# Patient Record
Sex: MALE | Race: WHITE | NOT HISPANIC OR LATINO
[De-identification: names, ages, dates, MRNs, and addresses within clinical notes are randomized per-mention and may not be internally consistent; named-entity substitution may affect disease eponyms.]

---

## 2019-11-04 ENCOUNTER — APPOINTMENT (OUTPATIENT)
Dept: RHEUMATOLOGY | Facility: CLINIC | Age: 74
End: 2019-11-04
Payer: MEDICARE

## 2019-11-04 VITALS
SYSTOLIC BLOOD PRESSURE: 163 MMHG | OXYGEN SATURATION: 100 % | TEMPERATURE: 98.4 F | WEIGHT: 190 LBS | BODY MASS INDEX: 28.14 KG/M2 | HEIGHT: 69 IN | HEART RATE: 63 BPM | DIASTOLIC BLOOD PRESSURE: 96 MMHG

## 2019-11-04 DIAGNOSIS — Z83.3 FAMILY HISTORY OF DIABETES MELLITUS: ICD-10-CM

## 2019-11-04 DIAGNOSIS — Z82.3 FAMILY HISTORY OF STROKE: ICD-10-CM

## 2019-11-04 DIAGNOSIS — Z80.6 FAMILY HISTORY OF LEUKEMIA: ICD-10-CM

## 2019-11-04 DIAGNOSIS — Z80.9 FAMILY HISTORY OF MALIGNANT NEOPLASM, UNSPECIFIED: ICD-10-CM

## 2019-11-04 DIAGNOSIS — Z78.9 OTHER SPECIFIED HEALTH STATUS: ICD-10-CM

## 2019-11-04 DIAGNOSIS — Z82.49 FAMILY HISTORY OF ISCHEMIC HEART DISEASE AND OTHER DISEASES OF THE CIRCULATORY SYSTEM: ICD-10-CM

## 2019-11-04 DIAGNOSIS — F19.90 OTHER PSYCHOACTIVE SUBSTANCE USE, UNSPECIFIED, UNCOMPLICATED: ICD-10-CM

## 2019-11-04 PROBLEM — Z00.00 ENCOUNTER FOR PREVENTIVE HEALTH EXAMINATION: Status: ACTIVE | Noted: 2019-11-04

## 2019-11-04 PROCEDURE — 99214 OFFICE O/P EST MOD 30 MIN: CPT | Mod: 25

## 2019-11-04 PROCEDURE — 36415 COLL VENOUS BLD VENIPUNCTURE: CPT

## 2019-11-04 RX ORDER — CHLORTHALIDONE 25 MG/1
25 TABLET ORAL
Qty: 90 | Refills: 0 | Status: ACTIVE | COMMUNITY
Start: 2019-08-01

## 2019-11-04 RX ORDER — APIXABAN 5 MG/1
5 TABLET, FILM COATED ORAL
Qty: 60 | Refills: 0 | Status: ACTIVE | COMMUNITY
Start: 2019-05-10

## 2019-11-04 RX ORDER — NEBIVOLOL HYDROCHLORIDE 20 MG/1
20 TABLET ORAL
Qty: 45 | Refills: 0 | Status: ACTIVE | COMMUNITY
Start: 2019-08-12

## 2019-11-04 RX ORDER — EVOLOCUMAB 140 MG/ML
140 INJECTION, SOLUTION SUBCUTANEOUS
Qty: 2 | Refills: 0 | Status: ACTIVE | COMMUNITY
Start: 2019-05-31

## 2019-11-04 RX ORDER — COLCHICINE 0.6 MG/1
0.6 TABLET ORAL
Qty: 60 | Refills: 0 | Status: ACTIVE | COMMUNITY
Start: 2019-11-04

## 2019-11-04 RX ORDER — AZILSARTAN KAMEDOXOMIL 80 MG/1
80 TABLET ORAL
Qty: 90 | Refills: 0 | Status: ACTIVE | COMMUNITY
Start: 2019-07-19

## 2019-11-05 ENCOUNTER — APPOINTMENT (OUTPATIENT)
Dept: RHEUMATOLOGY | Facility: CLINIC | Age: 74
End: 2019-11-05

## 2019-11-05 LAB
ALBUMIN SERPL ELPH-MCNC: 4.9 G/DL
ALP BLD-CCNC: 59 U/L
ALT SERPL-CCNC: 21 U/L
ANION GAP SERPL CALC-SCNC: 15 MMOL/L
AST SERPL-CCNC: 17 U/L
BASOPHILS # BLD AUTO: 0.02 K/UL
BASOPHILS NFR BLD AUTO: 0.2 %
BILIRUB SERPL-MCNC: 0.9 MG/DL
BUN SERPL-MCNC: 17 MG/DL
CALCIUM SERPL-MCNC: 10.7 MG/DL
CHLORIDE SERPL-SCNC: 102 MMOL/L
CO2 SERPL-SCNC: 25 MMOL/L
CREAT SERPL-MCNC: 1.21 MG/DL
EOSINOPHIL # BLD AUTO: 0 K/UL
EOSINOPHIL NFR BLD AUTO: 0 %
GLUCOSE SERPL-MCNC: 118 MG/DL
HCT VFR BLD CALC: 47.4 %
HGB BLD-MCNC: 15.4 G/DL
IMM GRANULOCYTES NFR BLD AUTO: 0.5 %
LYMPHOCYTES # BLD AUTO: 0.65 K/UL
LYMPHOCYTES NFR BLD AUTO: 6.9 %
MAN DIFF?: NORMAL
MCHC RBC-ENTMCNC: 27.4 PG
MCHC RBC-ENTMCNC: 32.5 GM/DL
MCV RBC AUTO: 84.2 FL
MONOCYTES # BLD AUTO: 0.41 K/UL
MONOCYTES NFR BLD AUTO: 4.3 %
NEUTROPHILS # BLD AUTO: 8.3 K/UL
NEUTROPHILS NFR BLD AUTO: 88.1 %
PLATELET # BLD AUTO: 324 K/UL
POTASSIUM SERPL-SCNC: 4.7 MMOL/L
PROT SERPL-MCNC: 7.4 G/DL
RBC # BLD: 5.63 M/UL
RBC # FLD: 14.2 %
SODIUM SERPL-SCNC: 142 MMOL/L
URATE SERPL-MCNC: 6.6 MG/DL
WBC # FLD AUTO: 9.43 K/UL

## 2019-11-05 NOTE — HISTORY OF PRESENT ILLNESS
[FreeTextEntry1] : This is a 74-year-old man he presents for an initial evaluation is been referred by his primary care physician Dr. Misbah Hathaway is a question of him having either gout or possibly pseudogout. He has been seen by me in the past for similar issues but not in quite some time. He has had long-standing difficult to manage hypertension he's apparently been on a variety of diuretics most recently hydrochlorothiazide 12-1/2 mg. He's had episodes of what sound like typical gout he reports that he's had approximately 4 episodes over the past 5 years however about a week or so ago he developed acute pain and swelling in his left big toe he was given a short course of prednisone starting at 40 mg a day and symptoms resolved however quickly returned he now comes in today with again recurrent pain and swelling in his right big toe he's been restarted on prednisone he's also been given a prescription for colchicine his last attack was probably 2 years ago in 2017. He does seem to avoid foods known associated with gout he does not drink beer he does not drink or do needs. He has had chronic renal insufficiency he has also had use of diuretics. He does have a history of hyperparathyroidism. He has had elevated calcium levels. There apparently has been discussion of a parathyroidectomy. However a parathyroid adenoma has never been identified and these never actually had surgery he also reports that he's been having some pain in his left hip this dates back about 6-1/2 weeks. His difficulty with walking but apparently does maintain motion there he's had an MRI of his hip and is scheduled to see a hip specialist he is wondering if gout could not also affect the hip. He again has had long-standing hypertension difficulty with management of his blood pressure.

## 2019-11-05 NOTE — DATA REVIEWED
[FreeTextEntry1] : Data from Dr. Crawley spine. Reviewed. Most recent labs were from approximately 2 months ago in September of 2019 creatinine was 1.56. CBC was normal. Urinalysis showed protein.

## 2019-11-05 NOTE — REVIEW OF SYSTEMS
[Feeling Poorly] : feeling poorly [Feeling Tired] : feeling tired [Arthralgias] : arthralgias [Joint Pain] : joint pain [Joint Swelling] : joint swelling [Joint Stiffness] : joint stiffness [Fever] : no fever [Chills] : no chills [Eye Pain] : no eye pain [Red Eyes] : eyes not red [Dry Eyes] : no dryness of the eyes [Chest Pain] : no chest pain [Palpitations] : no palpitations [Shortness Of Breath] : no shortness of breath [Wheezing] : no wheezing [Cough] : no cough [SOB on Exertion] : no shortness of breath during exertion [Abdominal Pain] : no abdominal pain [Vomiting] : no vomiting [Constipation] : no constipation [Diarrhea] : no diarrhea [Skin Lesions] : no skin lesions [Itching] : no itching [Muscle Weakness] : no muscle weakness [FreeTextEntry7] : Tolerance pertinent [FreeTextEntry9] : Pain in left big toe pain and left hip

## 2019-11-05 NOTE — PHYSICAL EXAM
[General Appearance - Alert] : alert [General Appearance - Well Nourished] : well nourished [General Appearance - Well Developed] : well developed [Examination Of The Oral Cavity] : the lips and gums were normal [Oropharynx] : the oropharynx was normal [] : no respiratory distress [Exaggerated Use Of Accessory Muscles For Inspiration] : no accessory muscle use [Auscultation Breath Sounds / Voice Sounds] : lungs were clear to auscultation bilaterally [Heart Rate And Rhythm] : heart rate was normal and rhythm regular [Heart Sounds] : normal S1 and S2 [No Spinal Tenderness] : no spinal tenderness [Cranial Nerves] : cranial nerves 2-12 were intact [Motor Exam] : the motor exam was normal [FreeTextEntry1] : There is acute podagra with redness and swelling of the first MTP joint on the left foot. There are no tophi. There is good range of motion of the left hip without limitations.

## 2019-11-05 NOTE — ASSESSMENT
[FreeTextEntry1] : This is a 74-year-old man. Presents for followup evaluation. He has a long-standing history do back about 5 years of recurrent episodes consistent with gout. He's had approximately 4 prior episodes comes in now with acute podagra of his left big toe. Started over a week ago initially responded to prednisone but with tapering off prednisone had rebound gout. Has opted to restart prednisone at 40 mg every day with a program for tapering. I am updating his bloods and uric acid today. He is on a diuretic which perhaps is contributing this has been discussed with both him and his primary care physician and may need to be reassessed as contributing to this current episode started him on colchicine at 0.6 once a day although may need to reduce his based on renal function. Again reviewed with him dietary considerations with gout. I plan to see him back in a week to discuss additional management. I am not sure what the frequency of these attacks that he requires long-term serum uric acid lowering. He does have a history of hyperparathyroidism. I don't think this represents pseudogout.

## 2019-11-14 ENCOUNTER — APPOINTMENT (OUTPATIENT)
Dept: RHEUMATOLOGY | Facility: CLINIC | Age: 74
End: 2019-11-14
Payer: MEDICARE

## 2019-11-14 PROCEDURE — 99213 OFFICE O/P EST LOW 20 MIN: CPT

## 2019-11-14 NOTE — REVIEW OF SYSTEMS
[Joint Pain] : joint pain [Joint Stiffness] : joint stiffness [Fever] : no fever [Feeling Poorly] : not feeling poorly [Chills] : no chills [Feeling Tired] : not feeling tired [Chest Pain] : no chest pain [Palpitations] : no palpitations [Abdominal Pain] : no abdominal pain [Diarrhea] : no diarrhea [Heartburn] : no heartburn [Joint Swelling] : no joint swelling [Arthralgias] : no arthralgias [Skin Lesions] : no skin lesions [Itching] : no itching [FreeTextEntry9] : Isolated to hip current [de-identified] : No tophi

## 2019-11-14 NOTE — HISTORY OF PRESENT ILLNESS
[FreeTextEntry1] : This is a 74-year-old man who presents for followup evaluation. 4 gout in his left big toe. He is completing a course of prednisone. He remains on colchicine once a day she is tolerating this well he comes for review of labs . He does have a history of hypertension which has been somewhat difficult to control. He remains on a diuretic which may be contributing to his predisposition to gout - He complains only of left hip pain is apparently seen an orthopedic surgeon regarding consideration of possible hip replacement is scheduled to see another orthopedist for consideration. He had no tophi. He has had elevation of calcium. He seen an endocrinologist he is undergoing a workup with elevated PTH levels.

## 2019-11-14 NOTE — DATA REVIEWED
[FreeTextEntry1] : I reviewed his labs creatinine appears within the normal range. Uric acid is 6.6. Calcium is 10.5. On a diuretic which may be contributing to this as well as gout

## 2019-11-14 NOTE — PHYSICAL EXAM
[General Appearance - Alert] : alert [General Appearance - Well Nourished] : well nourished [General Appearance - Well Developed] : well developed [No CVA Tenderness] : no ~M costovertebral angle tenderness [No Spinal Tenderness] : no spinal tenderness [Musculoskeletal - Swelling] : no joint swelling seen [Skin Color & Pigmentation] : normal skin color and pigmentation [] : no rash [FreeTextEntry1] : No tophi

## 2019-11-14 NOTE — ASSESSMENT
[FreeTextEntry1] : This is a 74-year-old man presents for followup evaluation he has a history of gout he's had a recent prolonged episode with rebound following stopping of steroids she is now only on daily colchicine. Finishing second course of prednisone. I recommended remaining off prednisone for now continuing colchicine we did discuss the possibility of allopurinol in the future but I am deferring this at this time we also went to dietary issues have also discussed the role of his diuretic in possibly contributing to his gout possibly contributing to his hypercalcemia. He will be following up with another orthopedic surgeon apparently has been advised that he have a left total hip replacement at some point.

## 2019-11-19 ENCOUNTER — FORM ENCOUNTER (OUTPATIENT)
Age: 74
End: 2019-11-19

## 2019-11-20 ENCOUNTER — OUTPATIENT (OUTPATIENT)
Dept: OUTPATIENT SERVICES | Facility: HOSPITAL | Age: 74
LOS: 1 days | End: 2019-11-20
Payer: MEDICARE

## 2019-11-20 ENCOUNTER — APPOINTMENT (OUTPATIENT)
Dept: ORTHOPEDIC SURGERY | Facility: CLINIC | Age: 74
End: 2019-11-20
Payer: MEDICARE

## 2019-11-20 VITALS — BODY MASS INDEX: 28.14 KG/M2 | HEIGHT: 69 IN | WEIGHT: 190 LBS

## 2019-11-20 PROCEDURE — 99203 OFFICE O/P NEW LOW 30 MIN: CPT

## 2019-11-20 PROCEDURE — 73521 X-RAY EXAM HIPS BI 2 VIEWS: CPT | Mod: 26

## 2019-11-20 PROCEDURE — 73521 X-RAY EXAM HIPS BI 2 VIEWS: CPT

## 2019-11-21 NOTE — ASSESSMENT
[FreeTextEntry1] : Assessment/Plan\par \par OA of left hip\par \par Patient would benefit from left DELMER\par \par Patient will benefit from the proposed procedure, he has failed a conservative treatment plan of supervised physical therapy, anti-inflammatory medications, and activity modification. He continues to have pain impacting his ability to perform ADL's and has a decreasing QoL. We will schedule this for the earliest mutually convenient time after the appropriate pre-op laboratory tests and clearances are obtained.  All questions were answered and a thorough explanation of RBA were given.\par \par All medical record entries made by the PA/Blazeibcornelio/Fellow are at my, Dr. Ruiz Castaneda's direction and personally dictated by me on 11/20/2019]. I have reviewed the chart and agree that the record accurately reflects my personal performance of the history, physical exam, assessment, and plan. I have also personally directed reviewed, and agreed with the chart.\par

## 2019-11-21 NOTE — PHYSICAL EXAM
[de-identified] : General: Not in acute distress, dressed appropriately, sitting on examination table\par Skin: Warm and dry, normal turgor, no rashes\par Neurological: AOx3, Cranial nerves grossly in tact\par Psych: Mood and affect appropriate\par \par Left Hip: No swelling edema erythema redness or drainage. Tender anteriorly. ROM: Hip flexion 120, abduction 30, int/ext rotation 45. Painful range of motion. 5/5 strength. Ambulates with cane. \par \par \par  [de-identified] : X-ray of left hip shows left hip OA

## 2019-11-21 NOTE — HISTORY OF PRESENT ILLNESS
[de-identified] : Kem is a 74 year old male who is here for left hip pain. Pain has progressively worsened over the past month. He denies any injury or trauma. Pain is dull and achy. Pain is worse with weightbearing and deep flexion. Patient was seen by pain management where he had a hip injection which was only effective for 1-2 days.

## 2019-11-25 ENCOUNTER — TRANSCRIPTION ENCOUNTER (OUTPATIENT)
Age: 74
End: 2019-11-25

## 2019-12-09 ENCOUNTER — TRANSCRIPTION ENCOUNTER (OUTPATIENT)
Age: 74
End: 2019-12-09

## 2020-01-13 ENCOUNTER — APPOINTMENT (OUTPATIENT)
Dept: RHEUMATOLOGY | Facility: CLINIC | Age: 75
End: 2020-01-13

## 2020-01-22 ENCOUNTER — APPOINTMENT (OUTPATIENT)
Dept: RHEUMATOLOGY | Facility: CLINIC | Age: 75
End: 2020-01-22
Payer: MEDICARE

## 2020-01-22 VITALS
HEIGHT: 69 IN | HEART RATE: 65 BPM | SYSTOLIC BLOOD PRESSURE: 160 MMHG | OXYGEN SATURATION: 97 % | WEIGHT: 194 LBS | DIASTOLIC BLOOD PRESSURE: 76 MMHG | BODY MASS INDEX: 28.73 KG/M2 | TEMPERATURE: 98 F

## 2020-01-22 DIAGNOSIS — M10.9 GOUT, UNSPECIFIED: ICD-10-CM

## 2020-01-22 DIAGNOSIS — M16.0 BILATERAL PRIMARY OSTEOARTHRITIS OF HIP: ICD-10-CM

## 2020-01-22 PROCEDURE — 99213 OFFICE O/P EST LOW 20 MIN: CPT

## 2020-01-22 NOTE — ASSESSMENT
[FreeTextEntry1] : This is a 74-year-old man difficult to manage doubt now currently asymptomatic with this colchicine every day possibly a bit higher based on creatinine of 1.2 however concerned about reduction in dose of colchicine to every other day with possible upcoming hip or possibly upcoming pancreas surgery have recommended continuing it I have reviewed with him his x-rays he does have osteoarthritis of both hips somewhat worse on the left than the right. He is deferring surgery on that hip until followup evaluation of pancreatic lesion.

## 2020-01-22 NOTE — DATA REVIEWED
[FreeTextEntry1] : Hip x-rays reviewed with the patient. Osteoarthritis of both hips worse on left than right

## 2020-01-22 NOTE — PHYSICAL EXAM
[General Appearance - Alert] : alert [General Appearance - Well Nourished] : well nourished [General Appearance - Well Developed] : well developed [FreeTextEntry1] : walks with a cane - no podagra - Decreased range of motion in the left hip.

## 2020-01-22 NOTE — REVIEW OF SYSTEMS
[Joint Pain] : joint pain [Feeling Poorly] : not feeling poorly [Abdominal Pain] : no abdominal pain [Vomiting] : no vomiting [Constipation] : no constipation [Joint Swelling] : no joint swelling [Joint Stiffness] : no joint stiffness [Diarrhea] : no diarrhea [FreeTextEntry7] : tolerates colchicine [FreeTextEntry9] : isolated to left hip pain

## 2020-01-22 NOTE — HISTORY OF PRESENT ILLNESS
[FreeTextEntry1] : on daily colchicine One pill daily tolerating it well no GI issues has had no gout attacks since starting this is now having hypertension management but back on diuretics without exacerbation of gout. Other issues include hip arthritis has been seen by orthopedic surgery have reviewed notes by Dr. Castaneda as well as x-rays. Does report interim history has had a incidental finding of a pancreatic mass as part of workup for hypertension that was been difficult to control for many years. Her biopsy awaiting results. Again no episodes of joint pain.

## 2020-01-24 ENCOUNTER — EMERGENCY (EMERGENCY)
Facility: HOSPITAL | Age: 75
LOS: 1 days | Discharge: HOME | End: 2020-01-24
Attending: EMERGENCY MEDICINE | Admitting: EMERGENCY MEDICINE
Payer: MEDICARE

## 2020-01-24 VITALS
SYSTOLIC BLOOD PRESSURE: 109 MMHG | OXYGEN SATURATION: 97 % | TEMPERATURE: 98 F | HEART RATE: 82 BPM | HEIGHT: 69 IN | RESPIRATION RATE: 18 BRPM | DIASTOLIC BLOOD PRESSURE: 57 MMHG

## 2020-01-24 VITALS
SYSTOLIC BLOOD PRESSURE: 118 MMHG | RESPIRATION RATE: 16 BRPM | DIASTOLIC BLOOD PRESSURE: 73 MMHG | TEMPERATURE: 99 F | HEART RATE: 76 BPM | OXYGEN SATURATION: 97 %

## 2020-01-24 LAB
ALBUMIN SERPL ELPH-MCNC: 3.3 G/DL — LOW (ref 3.4–5)
ALBUMIN SERPL ELPH-MCNC: 3.7 G/DL — SIGNIFICANT CHANGE UP (ref 3.4–5)
ALP SERPL-CCNC: 61 U/L — SIGNIFICANT CHANGE UP (ref 40–120)
ALP SERPL-CCNC: 67 U/L — SIGNIFICANT CHANGE UP (ref 40–120)
ALT FLD-CCNC: 31 U/L — SIGNIFICANT CHANGE UP (ref 12–42)
ALT FLD-CCNC: 38 U/L — SIGNIFICANT CHANGE UP (ref 12–42)
ANION GAP SERPL CALC-SCNC: 11 MMOL/L — SIGNIFICANT CHANGE UP (ref 9–16)
ANION GAP SERPL CALC-SCNC: 6 MMOL/L — LOW (ref 9–16)
APTT BLD: 39.5 SEC — HIGH (ref 27.5–36.3)
AST SERPL-CCNC: 21 U/L — SIGNIFICANT CHANGE UP (ref 15–37)
AST SERPL-CCNC: 25 U/L — SIGNIFICANT CHANGE UP (ref 15–37)
BASOPHILS # BLD AUTO: 0.08 K/UL — SIGNIFICANT CHANGE UP (ref 0–0.2)
BASOPHILS NFR BLD AUTO: 0.7 % — SIGNIFICANT CHANGE UP (ref 0–2)
BILIRUB SERPL-MCNC: 1 MG/DL — SIGNIFICANT CHANGE UP (ref 0.2–1.2)
BILIRUB SERPL-MCNC: 1.1 MG/DL — SIGNIFICANT CHANGE UP (ref 0.2–1.2)
BUN SERPL-MCNC: 25 MG/DL — HIGH (ref 7–23)
BUN SERPL-MCNC: 28 MG/DL — HIGH (ref 7–23)
CALCIUM SERPL-MCNC: 8.4 MG/DL — LOW (ref 8.5–10.5)
CALCIUM SERPL-MCNC: 9.2 MG/DL — SIGNIFICANT CHANGE UP (ref 8.5–10.5)
CHLORIDE SERPL-SCNC: 105 MMOL/L — SIGNIFICANT CHANGE UP (ref 96–108)
CHLORIDE SERPL-SCNC: 105 MMOL/L — SIGNIFICANT CHANGE UP (ref 96–108)
CO2 SERPL-SCNC: 26 MMOL/L — SIGNIFICANT CHANGE UP (ref 22–31)
CO2 SERPL-SCNC: 28 MMOL/L — SIGNIFICANT CHANGE UP (ref 22–31)
CREAT SERPL-MCNC: 1.8 MG/DL — HIGH (ref 0.5–1.3)
CREAT SERPL-MCNC: 1.92 MG/DL — HIGH (ref 0.5–1.3)
EOSINOPHIL # BLD AUTO: 0.03 K/UL — SIGNIFICANT CHANGE UP (ref 0–0.5)
EOSINOPHIL NFR BLD AUTO: 0.2 % — SIGNIFICANT CHANGE UP (ref 0–6)
GLUCOSE SERPL-MCNC: 164 MG/DL — HIGH (ref 70–99)
GLUCOSE SERPL-MCNC: 211 MG/DL — HIGH (ref 70–99)
HCT VFR BLD CALC: 48.8 % — SIGNIFICANT CHANGE UP (ref 39–50)
HGB BLD-MCNC: 16.8 G/DL — SIGNIFICANT CHANGE UP (ref 13–17)
IMM GRANULOCYTES NFR BLD AUTO: 0.5 % — SIGNIFICANT CHANGE UP (ref 0–1.5)
INR BLD: 1.19 — HIGH (ref 0.88–1.16)
LIDOCAIN IGE QN: 133 U/L — SIGNIFICANT CHANGE UP (ref 73–393)
LYMPHOCYTES # BLD AUTO: 0.57 K/UL — LOW (ref 1–3.3)
LYMPHOCYTES # BLD AUTO: 4.6 % — LOW (ref 13–44)
MAGNESIUM SERPL-MCNC: 1.6 MG/DL — SIGNIFICANT CHANGE UP (ref 1.6–2.6)
MAGNESIUM SERPL-MCNC: 5 MG/DL — HIGH (ref 1.6–2.6)
MCHC RBC-ENTMCNC: 28 PG — SIGNIFICANT CHANGE UP (ref 27–34)
MCHC RBC-ENTMCNC: 34.4 GM/DL — SIGNIFICANT CHANGE UP (ref 32–36)
MCV RBC AUTO: 81.3 FL — SIGNIFICANT CHANGE UP (ref 80–100)
MONOCYTES # BLD AUTO: 0.44 K/UL — SIGNIFICANT CHANGE UP (ref 0–0.9)
MONOCYTES NFR BLD AUTO: 3.6 % — SIGNIFICANT CHANGE UP (ref 2–14)
NEUTROPHILS # BLD AUTO: 11.08 K/UL — HIGH (ref 1.8–7.4)
NEUTROPHILS NFR BLD AUTO: 90.4 % — HIGH (ref 43–77)
NRBC # BLD: 0 /100 WBCS — SIGNIFICANT CHANGE UP (ref 0–0)
NT-PROBNP SERPL-SCNC: 1455 PG/ML — HIGH
PLATELET # BLD AUTO: 388 K/UL — SIGNIFICANT CHANGE UP (ref 150–400)
POTASSIUM SERPL-MCNC: 3 MMOL/L — LOW (ref 3.5–5.3)
POTASSIUM SERPL-MCNC: 3.6 MMOL/L — SIGNIFICANT CHANGE UP (ref 3.5–5.3)
POTASSIUM SERPL-SCNC: 3 MMOL/L — LOW (ref 3.5–5.3)
POTASSIUM SERPL-SCNC: 3.6 MMOL/L — SIGNIFICANT CHANGE UP (ref 3.5–5.3)
PROT SERPL-MCNC: 6.5 G/DL — SIGNIFICANT CHANGE UP (ref 6.4–8.2)
PROT SERPL-MCNC: 7.4 G/DL — SIGNIFICANT CHANGE UP (ref 6.4–8.2)
PROTHROM AB SERPL-ACNC: 13.3 SEC — HIGH (ref 10–12.9)
RBC # BLD: 6 M/UL — HIGH (ref 4.2–5.8)
RBC # FLD: 14.2 % — SIGNIFICANT CHANGE UP (ref 10.3–14.5)
SODIUM SERPL-SCNC: 139 MMOL/L — SIGNIFICANT CHANGE UP (ref 132–145)
SODIUM SERPL-SCNC: 142 MMOL/L — SIGNIFICANT CHANGE UP (ref 132–145)
TROPONIN I SERPL-MCNC: <0.017 NG/ML — LOW (ref 0.02–0.06)
TROPONIN I SERPL-MCNC: <0.017 NG/ML — LOW (ref 0.02–0.06)
WBC # BLD: 12.26 K/UL — HIGH (ref 3.8–10.5)
WBC # FLD AUTO: 12.26 K/UL — HIGH (ref 3.8–10.5)

## 2020-01-24 PROCEDURE — 72125 CT NECK SPINE W/O DYE: CPT | Mod: 26

## 2020-01-24 PROCEDURE — 70450 CT HEAD/BRAIN W/O DYE: CPT | Mod: 26

## 2020-01-24 PROCEDURE — 99291 CRITICAL CARE FIRST HOUR: CPT

## 2020-01-24 PROCEDURE — 93010 ELECTROCARDIOGRAM REPORT: CPT

## 2020-01-24 PROCEDURE — 99284 EMERGENCY DEPT VISIT MOD MDM: CPT

## 2020-01-24 PROCEDURE — 74176 CT ABD & PELVIS W/O CONTRAST: CPT | Mod: 26

## 2020-01-24 RX ORDER — IOHEXOL 300 MG/ML
30 INJECTION, SOLUTION INTRAVENOUS ONCE
Refills: 0 | Status: COMPLETED | OUTPATIENT
Start: 2020-01-24 | End: 2020-01-24

## 2020-01-24 RX ORDER — APIXABAN 2.5 MG/1
5 TABLET, FILM COATED ORAL ONCE
Refills: 0 | Status: COMPLETED | OUTPATIENT
Start: 2020-01-24 | End: 2020-01-24

## 2020-01-24 RX ORDER — POTASSIUM CHLORIDE 20 MEQ
40 PACKET (EA) ORAL ONCE
Refills: 0 | Status: COMPLETED | OUTPATIENT
Start: 2020-01-24 | End: 2020-01-24

## 2020-01-24 RX ORDER — POTASSIUM CHLORIDE 20 MEQ
10 PACKET (EA) ORAL ONCE
Refills: 0 | Status: COMPLETED | OUTPATIENT
Start: 2020-01-24 | End: 2020-01-24

## 2020-01-24 RX ORDER — SODIUM CHLORIDE 9 MG/ML
1000 INJECTION INTRAMUSCULAR; INTRAVENOUS; SUBCUTANEOUS ONCE
Refills: 0 | Status: COMPLETED | OUTPATIENT
Start: 2020-01-24 | End: 2020-01-24

## 2020-01-24 RX ORDER — MAGNESIUM SULFATE 500 MG/ML
1 VIAL (ML) INJECTION ONCE
Refills: 0 | Status: COMPLETED | OUTPATIENT
Start: 2020-01-24 | End: 2020-01-24

## 2020-01-24 RX ORDER — METOPROLOL TARTRATE 50 MG
25 TABLET ORAL ONCE
Refills: 0 | Status: COMPLETED | OUTPATIENT
Start: 2020-01-24 | End: 2020-01-24

## 2020-01-24 RX ORDER — METOPROLOL TARTRATE 50 MG
5 TABLET ORAL ONCE
Refills: 0 | Status: COMPLETED | OUTPATIENT
Start: 2020-01-24 | End: 2020-01-24

## 2020-01-24 RX ADMIN — Medication 5 MILLIGRAM(S): at 09:27

## 2020-01-24 RX ADMIN — SODIUM CHLORIDE 1000 MILLILITER(S): 9 INJECTION INTRAMUSCULAR; INTRAVENOUS; SUBCUTANEOUS at 12:56

## 2020-01-24 RX ADMIN — SODIUM CHLORIDE 1000 MILLILITER(S): 9 INJECTION INTRAMUSCULAR; INTRAVENOUS; SUBCUTANEOUS at 10:19

## 2020-01-24 RX ADMIN — IOHEXOL 30 MILLILITER(S): 300 INJECTION, SOLUTION INTRAVENOUS at 09:13

## 2020-01-24 RX ADMIN — Medication 110 MILLIGRAM(S): at 09:12

## 2020-01-24 RX ADMIN — Medication 25 MILLIGRAM(S): at 09:12

## 2020-01-24 RX ADMIN — APIXABAN 5 MILLIGRAM(S): 2.5 TABLET, FILM COATED ORAL at 09:27

## 2020-01-24 RX ADMIN — SODIUM CHLORIDE 1000 MILLILITER(S): 9 INJECTION INTRAMUSCULAR; INTRAVENOUS; SUBCUTANEOUS at 09:14

## 2020-01-24 RX ADMIN — Medication 1 GRAM(S): at 10:19

## 2020-01-24 RX ADMIN — Medication 100 GRAM(S): at 09:20

## 2020-01-24 RX ADMIN — Medication 40 MILLIEQUIVALENT(S): at 09:20

## 2020-01-24 RX ADMIN — Medication 40 MILLIEQUIVALENT(S): at 13:42

## 2020-01-24 NOTE — ED PROVIDER NOTE - PATIENT PORTAL LINK FT
You can access the FollowMyHealth Patient Portal offered by [facility] by registering at the following website: [email or web address]. By joining [employer]’s FollowMyHealth portal, you will also be able to view your health information using other applications (apps) compatible with our system.

## 2020-01-24 NOTE — ED PROVIDER NOTE - CLINICAL SUMMARY MEDICAL DECISION MAKING FREE TEXT BOX
Pt presenting with Afib in the setting of N/V/D. GI sx resemble AGE though it is unclear if his tumor is contributing. Abd tenderness is also notable. Will hydrate, replete electrolytes, give PO and IV beta-blockers and check CT AP with PO contrast. Seen by Dr. Sutton of cardiology.

## 2020-01-24 NOTE — ED PROVIDER NOTE - NSFOLLOWUPINSTRUCTIONS_ED_ALL_ED_FT
Please eat high potassium foods for the next few days like Avocado, potato and banana as well as take you potassium supplement.     Please follow up with Dr. Hathaway this week.    Have a low threshold to return to the ER for any worsening symptoms.    Nausea / Vomiting    Nausea is the feeling that you have to vomit. As nausea gets worse, it can lead to vomiting. Vomiting puts you at an increased risk for dehydration. Older adults and people with other diseases or a weak immune system are at higher risk for dehydration. Drink clear fluids in small but frequent amounts as tolerated. Eat bland, easy-to-digest foods in small amounts as tolerated.    SEEK IMMEDIATE MEDICAL CARE IF YOU HAVE ANY OF THE FOLLOWING SYMPTOMS: fever, inability to keep sufficient fluids down, black or bloody vomitus, black or bloody stools, lightheadedness/dizziness, chest pain, severe headache, rash, shortness of breath, cold or clammy skin, confusion, pain with urination, or any signs of dehydration.     Syncope    Syncope is when you temporarily lose consciousness, also called fainting or passing out. It is caused by a sudden decrease in blood flow to the brain. Even though most causes of syncope are not dangerous, syncope can possibly be a sign of a serious medical problem. Signs that you may be about to faint include feeling dizzy, lightheaded, nausea, visual changes, or cold/clammy skin. Do not drive, operate heavy machinery, or play sports until your health care provider says it is okay.    SEEK IMMEDIATE MEDICAL CARE IF YOU HAVE ANY OF THE FOLLOWING SYMPTOMS: severe headache, pain in your chest/abdomen/back, bleeding from your mouth or rectum, palpitations, shortness of breath, pain with breathing, seizure, confusion, or trouble walking.

## 2020-01-24 NOTE — ED ADULT TRIAGE NOTE - CHIEF COMPLAINT QUOTE
BIBA for syncopal episode with a fall. Per SO, pt had a syncopal episode at home and found on the floor, +LOC and nausea and vomiting. Pt also endorsed diarrhea since last night. BIBA for syncopal episode with a fall. Per SO, pt had a syncopal episode at home and found on the floor, +LOC and nausea and vomiting. Pt also endorsed diarrhea since last night. Pt also noted with abrasion to bridge of the nose. Pt is on eliquis.

## 2020-01-24 NOTE — ED ADULT NURSE NOTE - CHIEF COMPLAINT QUOTE
BIBA for syncopal episode with a fall. Per SO, pt had a syncopal episode at home and found on the floor, +LOC and nausea and vomiting. Pt also endorsed diarrhea since last night. Pt also noted with abrasion to bridge of the nose. Pt is on eliquis.

## 2020-01-24 NOTE — ED PROVIDER NOTE - CARE PLAN
Principal Discharge DX:	Nausea, vomiting and diarrhea  Secondary Diagnosis:	Paroxysmal atrial fibrillation  Secondary Diagnosis:	PVCs (premature ventricular contractions)  Secondary Diagnosis:	Hypokalemia

## 2020-01-24 NOTE — ED PROVIDER NOTE - OBJECTIVE STATEMENT
74 y o male presents with N/V/D all night. Onset of vomiting was more than diarrhea. Noted approximately 4 episodes of diarrhea, that started off watery and decreased in volume over course of evening, and innumerable episodes of emesis. During one episode of vomiting, he felt himself go into episode of Afib. This has happened before. Pt has hx of paroxysmal Afib that has been brought on by vomiting in the past. He had no assocated chest pain but did feel he needed to take shallow breathes when onset occurred. Pt was stable throughout the night. This morning, after using restroom for a BM, pt stood up and syncopized. His wife found him laying on his side, unconscious, and vomiting. Pt remained unconscious for a few minutes and woke up without incident. Denies injury. Notes some right sided abd pain. No chest pain or SOB. No fevers or chills. 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 Pt has chronically elevated levels of gastrin, pancreas statin, and parathyroid hormone. Baseline creatinine on 1.56. Followed closely by PMD, Dr. Serenity Hathaway, who reports that with previous episodes of Afib, he has not had serious PVC as he does now.     Office: [phone], cell: [phone]

## 2020-01-24 NOTE — CONSULT NOTE ADULT - SUBJECTIVE AND OBJECTIVE BOX
[facility] Cardiology Consultation    CHIEF COMPLAINT: syncope    HISTORY OF PRESENT ILLNESS: 75 y/o male with history of pAF presented today after passing out at home. The patient was feeling unwell for several days. He has had abdominal discomfort and noted to have some diarrhea. It seems that he was out briefly and was noted to be in AF on arrival. Ectopic beats were also noted. No chest discomfort. No dizziness.     PAST MEDICAL & SURGICAL HISTORY:  HTN (hypertension)  Pancreatic tumor  Atrial fibrillation  No significant past surgical history        Allergies    penicillins (Hives)  sulfa drugs (Hives)    Intolerances      MEDICATIONS:  reviewed      FAMILY HISTORY: unremarkable    SOCIAL HISTORY:  no EtOH, tobacco or drug use    REVIEW OF SYSTEMS:  CONSTITUTIONAL: No fever, weight loss, or fatigue  EYES: No eye pain, visual disturbances, or discharge  ENMT:  No difficulty hearing, tinnitus, vertigo; No sinus or throat pain  NECK: No pain or stiffness  BREASTS: No pain, masses, or nipple discharge  RESPIRATORY: No cough, wheezing, chills or hemoptysis; No Shortness of Breath  CARDIOVASCULAR: No chest pain, palpitations, dizziness, or leg swelling  GASTROINTESTINAL: No abdominal or epigastric pain. No nausea, vomiting, or hematemesis; No diarrhea or constipation. No melena or hematochezia.  GENITOURINARY: No dysuria, frequency, hematuria, or incontinence  NEUROLOGICAL: No headaches, memory loss, loss of strength, numbness, or tremors  SKIN: No itching, burning, rashes, or lesions   LYMPH Nodes: No enlarged glands  ENDOCRINE: No heat or cold intolerance; No hair loss  MUSCULOSKELETAL: No joint pain or swelling; No muscle, back, or extremity pain  PSYCHIATRIC: No depression, anxiety, mood swings, or difficulty sleeping  HEME/LYMPH: No easy bruising, or bleeding gums  ALLERY AND IMMUNOLOGIC: No hives or eczema	      PHYSICAL EXAM:  T(C): 37 (01-24-20 @ 13:06), Max: 37 (01-24-20 @ 13:06)  HR: 76 (01-24-20 @ 13:06) (76 - 97)  BP: 118/73 (01-24-20 @ 13:06) (98/63 - 121/55)  RR: 16 (01-24-20 @ 13:06) (16 - 18)  SpO2: 97% (01-24-20 @ 13:06) (94% - 97%)  Appearance: middle aged male NAD 	  HEENT:   Normal oral mucosa, PERRL, EOMI	  Lymphatic: No lymphadenopathy  Cardiovascular: Normal S1 S2, No JVD, No murmurs, No edema  Respiratory: Lungs clear to auscultation	  Psychiatry: A & O x 3, Mood & affect appropriate  Gastrointestinal:  Soft, Non-tender, + BS	  Skin: No rashes, No ecchymoses, No cyanosis	  Neurologic: Non-focal  Extremities: Normal range of motion, No clubbing, cyanosis or edema  Vascular: Peripheral pulses palpable 2+ bilaterally  	    ECG:  	AF with frequent PVCs     LABS:	 	  CARDIAC MARKERS:  Troponin I, Serum: <0.017 ng/mL (01-24 @ 12:58)  Troponin I, Serum: <0.017 ng/mL (01-24 @ 07:29)                                  16.8   12.26 )-----------( 388      ( 24 Jan 2020 07:29 )             48.8     01-24    139  |  105  |  25<H>  ----------------------------<  211<H>  3.0<L>   |  28  |  1.80<H>    Ca    8.4<L>      24 Jan 2020 12:58  Mg     5.0     01-24    TPro  6.5  /  Alb  3.3<L>  /  TBili  1.0  /  DBili  x   /  AST  21  /  ALT  31  /  AlkPhos  61  01-24    proBNP: Serum Pro-Brain Natriuretic Peptide: 1455 pg/mL (01-24 @ 07:29)      ASSESSMENT/PLAN: 	73y/o male with vagal syncope and pAF  1. patient seen and examined, chart reviewed  2. cont anticoagulation  3. metoprolol for rate control   4. check CE x 2   5. keep K above 4 and Mg above 2  6. happy to see in the office     I spent 45 min in care of this patient

## 2020-01-24 NOTE — ED PROVIDER NOTE - PROGRESS NOTE DETAILS
Repeat labs show low K+. Patient took PO 40 again, refused IV and refused to stay. Will take PO K+ and eat high K+ foods and fu with his PMD.

## 2020-01-24 NOTE — ED PROVIDER NOTE - CARE PROVIDER_API CALL
Lazaro Gabriel)  Cardiovascular Disease  [address] [place] [address]  Phone: [phone]  Fax: [phone]  Follow Up Time:

## 2020-01-24 NOTE — ED PROVIDER NOTE - RAPID ASSESSMENT
pt w/ syncope, w/ prodrome of feeling LH.  preceding diarrhea last night.  finished a bout of bm this morning, got up, and felt LH and fell.  hx of afib (sp ablation, normally in nsr, on eliqus, bystolic), hx of paroxysmal htn.  currently in fib but rate low 100s.  nontoxic appearing, will start w/ ekg, labs, ct, will need further assessment from day team

## 2020-01-29 PROBLEM — D49.0 NEOPLASM OF UNSPECIFIED BEHAVIOR OF DIGESTIVE SYSTEM: Chronic | Status: ACTIVE | Noted: 2020-01-24

## 2020-01-29 PROBLEM — I48.91 UNSPECIFIED ATRIAL FIBRILLATION: Chronic | Status: ACTIVE | Noted: 2020-01-24

## 2020-01-29 PROBLEM — I10 ESSENTIAL (PRIMARY) HYPERTENSION: Chronic | Status: ACTIVE | Noted: 2020-01-24

## 2020-01-31 DIAGNOSIS — I48.0 PAROXYSMAL ATRIAL FIBRILLATION: ICD-10-CM

## 2020-01-31 DIAGNOSIS — E87.6 HYPOKALEMIA: ICD-10-CM

## 2020-01-31 DIAGNOSIS — R55 SYNCOPE AND COLLAPSE: ICD-10-CM

## 2020-01-31 DIAGNOSIS — I49.3 VENTRICULAR PREMATURE DEPOLARIZATION: ICD-10-CM

## 2020-02-12 ENCOUNTER — APPOINTMENT (OUTPATIENT)
Dept: HEART AND VASCULAR | Facility: CLINIC | Age: 75
End: 2020-02-12
Payer: MEDICARE

## 2020-02-12 ENCOUNTER — NON-APPOINTMENT (OUTPATIENT)
Age: 75
End: 2020-02-12

## 2020-02-12 VITALS
TEMPERATURE: 97.5 F | BODY MASS INDEX: 28.58 KG/M2 | DIASTOLIC BLOOD PRESSURE: 84 MMHG | HEART RATE: 69 BPM | SYSTOLIC BLOOD PRESSURE: 139 MMHG | OXYGEN SATURATION: 98 % | WEIGHT: 193 LBS | HEIGHT: 69 IN

## 2020-02-12 DIAGNOSIS — I10 ESSENTIAL (PRIMARY) HYPERTENSION: ICD-10-CM

## 2020-02-12 DIAGNOSIS — I48.0 PAROXYSMAL ATRIAL FIBRILLATION: ICD-10-CM

## 2020-02-12 DIAGNOSIS — E78.5 HYPERLIPIDEMIA, UNSPECIFIED: ICD-10-CM

## 2020-02-12 PROCEDURE — 99214 OFFICE O/P EST MOD 30 MIN: CPT

## 2020-02-12 PROCEDURE — 93000 ELECTROCARDIOGRAM COMPLETE: CPT

## 2020-02-13 ENCOUNTER — APPOINTMENT (OUTPATIENT)
Dept: HEART AND VASCULAR | Facility: CLINIC | Age: 75
End: 2020-02-13

## 2020-02-13 PROBLEM — I10 BENIGN HYPERTENSION: Status: ACTIVE | Noted: 2020-02-13

## 2020-02-13 PROBLEM — E78.5 HYPERLIPIDEMIA LDL GOAL <70: Status: ACTIVE | Noted: 2020-02-13

## 2020-02-13 NOTE — DISCUSSION/SUMMARY
[FreeTextEntry1] : HTN - POLA  and I had an extensive discussion regarding his blood pressure management. Patient will continue taking current medications in addition to maintaining a low Na diet, with periodic b/p checks at home.\par HLD POLA and I discussed his lipid panel and individualized target LDL goal. At this point, will do diet and exercise with anticipation of re-evaluating labs in 3-6 months\par AF cont medications, echo pending, ep eval, ekg noted.

## 2020-02-13 NOTE — PHYSICAL EXAM
[Normal Appearance] : normal appearance [General Appearance - Well Developed] : well developed [Well Groomed] : well groomed [General Appearance - Well Nourished] : well nourished [No Deformities] : no deformities [General Appearance - In No Acute Distress] : no acute distress [Normal Conjunctiva] : the conjunctiva exhibited no abnormalities [Eyelids - No Xanthelasma] : the eyelids demonstrated no xanthelasmas [Normal Oral Mucosa] : normal oral mucosa [No Oral Pallor] : no oral pallor [No Oral Cyanosis] : no oral cyanosis [Normal Jugular Venous A Waves Present] : normal jugular venous A waves present [Normal Jugular Venous V Waves Present] : normal jugular venous V waves present [No Jugular Venous Zazueta A Waves] : no jugular venous zazueta A waves [Respiration, Rhythm And Depth] : normal respiratory rhythm and effort [Exaggerated Use Of Accessory Muscles For Inspiration] : no accessory muscle use [Auscultation Breath Sounds / Voice Sounds] : lungs were clear to auscultation bilaterally [Heart Rate And Rhythm] : heart rate and rhythm were normal [Heart Sounds] : normal S1 and S2 [Murmurs] : no murmurs present [Abdomen Soft] : soft [Abdomen Tenderness] : non-tender [Abnormal Walk] : normal gait [Abdomen Mass (___ Cm)] : no abdominal mass palpated [Gait - Sufficient For Exercise Testing] : the gait was sufficient for exercise testing [Nail Clubbing] : no clubbing of the fingernails [Cyanosis, Localized] : no localized cyanosis [Petechial Hemorrhages (___cm)] : no petechial hemorrhages [Skin Color & Pigmentation] : normal skin color and pigmentation [] : no rash [No Venous Stasis] : no venous stasis [Skin Lesions] : no skin lesions [No Skin Ulcers] : no skin ulcer [Oriented To Time, Place, And Person] : oriented to person, place, and time [No Xanthoma] : no  xanthoma was observed [No Anxiety] : not feeling anxious [Mood] : the mood was normal [Affect] : the affect was normal

## 2020-02-13 NOTE — REASON FOR VISIT
[Follow-Up - Clinic] : a clinic follow-up of [Atrial Fibrillation] : atrial fibrillation [FreeTextEntry1] :  74 y o male presented with N/V/D all night to [facility] .He was noted to be in AF. Onset of vomiting was more than diarrhea. Noted approximately 4 episodes of diarrhea, that started off watery and decreased in volume over course of evening, and innumerable episodes of emesis. During one episode of vomiting, he felt himself go into episode of Afib. This has happened before. Pt has hx of paroxysmal Afib that has been brought on by vomiting in the past. He had no assocated chest pain but did feel he needed to take shallow breathes when onset occurred. Pt was stable throughout the night. This morning, after using restroom for a BM, pt stood up and syncopized. His wife found him laying on his side, unconscious, and vomiting. Pt remained unconscious for a few minutes and woke up without incident. Denies injury. Notes some right sided abd pain. No chest pain or SOB. No fevers or chills. 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 Pt has chronically elevated\par levels of gastrin, pancreas statin, and parathyroid hormone. Baseline\par creatinine on 1.56. Followed closely by PMD, Dr. Serenity Hathaway, who reports that\par with previous episodes of Afib, he has not had serious PVC as he does now.\par

## 2020-02-14 ENCOUNTER — APPOINTMENT (OUTPATIENT)
Dept: HEART AND VASCULAR | Facility: CLINIC | Age: 75
End: 2020-02-14

## 2020-02-27 ENCOUNTER — APPOINTMENT (OUTPATIENT)
Dept: HEART AND VASCULAR | Facility: CLINIC | Age: 75
End: 2020-02-27

## 2020-03-10 ENCOUNTER — TRANSCRIPTION ENCOUNTER (OUTPATIENT)
Age: 75
End: 2020-03-10

## 2020-09-09 ENCOUNTER — APPOINTMENT (OUTPATIENT)
Dept: RHEUMATOLOGY | Facility: CLINIC | Age: 75
End: 2020-09-09
Payer: MEDICARE

## 2020-09-09 VITALS
HEART RATE: 79 BPM | OXYGEN SATURATION: 98 % | TEMPERATURE: 98.1 F | BODY MASS INDEX: 27.55 KG/M2 | DIASTOLIC BLOOD PRESSURE: 86 MMHG | SYSTOLIC BLOOD PRESSURE: 147 MMHG | WEIGHT: 186 LBS | HEIGHT: 69 IN

## 2020-09-09 PROCEDURE — 99213 OFFICE O/P EST LOW 20 MIN: CPT

## 2020-09-10 NOTE — PHYSICAL EXAM
[General Appearance - Alert] : alert [General Appearance - Well Nourished] : well nourished [General Appearance - Well Developed] : well developed [Edema] : there was no peripheral edema [Abnormal Walk] : normal gait [] : no rash [FreeTextEntry1] : and there is some decreased range of motion of the left hip there is no acute synovitis and there is no evidence of any gout

## 2020-09-10 NOTE — ASSESSMENT
[FreeTextEntry1] : 74-year-old man history of gout history of osteoarthritis reviewed with him his x-ray I recommended continuing colchicine he may use an occasional Tylenol if needed avoid nonsteroidals

## 2020-09-10 NOTE — REVIEW OF SYSTEMS
[Arthralgias] : arthralgias [Joint Pain] : joint pain [Joint Stiffness] : joint stiffness [Fever] : no fever [Chills] : no chills [Feeling Poorly] : not feeling poorly [Feeling Tired] : not feeling tired [Skin Lesions] : no skin lesions [Joint Swelling] : no joint swelling [Itching] : no itching

## 2020-09-10 NOTE — HISTORY OF PRESENT ILLNESS
[FreeTextEntry1] : this is a 74-year-old manhe presents for followup evaluation he has a history of osteoarthritis he's had osteoarthritis of both hips left greater than right he's also had episodes of gout he does have a number of comorbid condition- he has had atrial fibrillation he's been on diuretics he is on blood thinners he avoids anti-inflammatory medicines he comes for followup visit he is on colchicine one a day he tolerated this well he has no diarrhea or he's had no davian flares but continues to have foot discomfort as well as hip discomfort and right shoulder discomfort- he has had GI issues and is scheduled for procedure in the next few weeks

## 2020-09-11 ENCOUNTER — APPOINTMENT (OUTPATIENT)
Dept: OTOLARYNGOLOGY | Facility: CLINIC | Age: 75
End: 2020-09-11
Payer: MEDICARE

## 2020-09-11 VITALS — WEIGHT: 190 LBS | HEIGHT: 69 IN | BODY MASS INDEX: 28.14 KG/M2 | TEMPERATURE: 98.6 F

## 2020-09-11 DIAGNOSIS — Z82.49 FAMILY HISTORY OF ISCHEMIC HEART DISEASE AND OTHER DISEASES OF THE CIRCULATORY SYSTEM: ICD-10-CM

## 2020-09-11 DIAGNOSIS — Z86.79 PERSONAL HISTORY OF OTHER DISEASES OF THE CIRCULATORY SYSTEM: ICD-10-CM

## 2020-09-11 DIAGNOSIS — Z85.820 PERSONAL HISTORY OF MALIGNANT MELANOMA OF SKIN: ICD-10-CM

## 2020-09-11 DIAGNOSIS — Z87.39 PERSONAL HISTORY OF OTHER DISEASES OF THE MUSCULOSKELETAL SYSTEM AND CONNECTIVE TISSUE: ICD-10-CM

## 2020-09-11 DIAGNOSIS — J31.0 CHRONIC RHINITIS: ICD-10-CM

## 2020-09-11 DIAGNOSIS — F17.200 NICOTINE DEPENDENCE, UNSPECIFIED, UNCOMPLICATED: ICD-10-CM

## 2020-09-11 DIAGNOSIS — Z86.2 PERSONAL HISTORY OF DISEASES OF THE BLOOD AND BLOOD-FORMING ORGANS AND CERTAIN DISORDERS INVOLVING THE IMMUNE MECHANISM: ICD-10-CM

## 2020-09-11 DIAGNOSIS — H61.22 IMPACTED CERUMEN, LEFT EAR: ICD-10-CM

## 2020-09-11 DIAGNOSIS — Z80.0 FAMILY HISTORY OF MALIGNANT NEOPLASM OF DIGESTIVE ORGANS: ICD-10-CM

## 2020-09-11 PROCEDURE — 69210 REMOVE IMPACTED EAR WAX UNI: CPT

## 2020-09-11 PROCEDURE — 31231 NASAL ENDOSCOPY DX: CPT

## 2020-09-11 PROCEDURE — 99213 OFFICE O/P EST LOW 20 MIN: CPT | Mod: 25

## 2020-09-11 RX ORDER — PREDNISONE 10 MG/1
10 TABLET ORAL
Qty: 120 | Refills: 1 | Status: DISCONTINUED | COMMUNITY
Start: 2019-11-04 | End: 2020-09-11

## 2020-09-11 RX ORDER — MOXIFLOXACIN OPHTHALMIC 5 MG/ML
0.5 SOLUTION/ DROPS OPHTHALMIC
Qty: 3 | Refills: 0 | Status: DISCONTINUED | COMMUNITY
Start: 2019-09-25 | End: 2020-09-11

## 2020-09-11 RX ORDER — INDOMETHACIN 50 MG/1
50 CAPSULE ORAL
Qty: 60 | Refills: 0 | Status: DISCONTINUED | COMMUNITY
Start: 2019-10-29 | End: 2020-09-11

## 2020-09-11 RX ORDER — TADALAFIL 20 MG/1
20 TABLET ORAL
Qty: 10 | Refills: 0 | Status: DISCONTINUED | COMMUNITY
Start: 2019-08-07 | End: 2020-09-11

## 2020-09-11 RX ORDER — HYDROCHLOROTHIAZIDE 12.5 MG/1
12.5 CAPSULE ORAL
Qty: 90 | Refills: 0 | Status: DISCONTINUED | COMMUNITY
Start: 2019-08-29 | End: 2020-09-11

## 2020-09-11 RX ORDER — DOXAZOSIN 4 MG/1
4 TABLET ORAL
Qty: 90 | Refills: 0 | Status: DISCONTINUED | COMMUNITY
Start: 2019-06-24 | End: 2020-09-11

## 2020-09-11 RX ORDER — PREDNISONE 5 MG/1
5 TABLET ORAL
Qty: 80 | Refills: 0 | Status: DISCONTINUED | COMMUNITY
Start: 2019-10-29 | End: 2020-09-11

## 2020-09-11 RX ORDER — PREDNISOLONE ACETATE 10 MG/ML
1 SUSPENSION/ DROPS OPHTHALMIC
Qty: 10 | Refills: 0 | Status: DISCONTINUED | COMMUNITY
Start: 2019-09-25 | End: 2020-09-11

## 2020-09-11 RX ORDER — MUPIROCIN 20 MG/G
2 OINTMENT TOPICAL
Qty: 22 | Refills: 0 | Status: DISCONTINUED | COMMUNITY
Start: 2019-09-09 | End: 2020-09-11

## 2020-09-11 NOTE — ASSESSMENT
[FreeTextEntry1] : POLA LOCKE has resolved epistaxis on Eliquis. I suggested stopping all sprays and applying Vaseline to caudal septum.

## 2020-09-11 NOTE — REVIEW OF SYSTEMS
[Nasal Congestion] : nasal congestion [Negative] : Heme/Lymph [Patient Intake Form Reviewed] : Patient intake form was reviewed [FreeTextEntry4] : neck and jaw pain [de-identified] : headache

## 2020-09-11 NOTE — CONSULT LETTER
[Dear  ___] : Dear  [unfilled], [Consult Letter:] : I had the pleasure of evaluating your patient, [unfilled]. [Please see my note below.] : Please see my note below. [Consult Closing:] : Thank you very much for allowing me to participate in the care of this patient.  If you have any questions, please do not hesitate to contact me. [Sincerely,] : Sincerely, [FreeTextEntry3] : Babatunde Deng MD\par

## 2020-09-11 NOTE — HISTORY OF PRESENT ILLNESS
[de-identified] : POLA LOCKE is a 74 year man with a history of epistaxis on eliquis for afib. He started having multiple episodes of bilateral epistaxis since August. He had nasal cautery done. He last bled 3 weeks ago. He has rhinitis and ahd been using flonase.

## 2020-11-17 NOTE — ED PROVIDER NOTE - HISTORY ATTESTATION, MLM
Impairment Codes:16 Debility (Non-Cardiac/Non-Pulmonary)
I have reviewed and confirmed nurses' notes...

## 2020-11-19 ENCOUNTER — APPOINTMENT (OUTPATIENT)
Dept: RHEUMATOLOGY | Facility: CLINIC | Age: 75
End: 2020-11-19
Payer: MEDICARE

## 2020-11-19 VITALS
BODY MASS INDEX: 28.88 KG/M2 | TEMPERATURE: 97.8 F | HEIGHT: 69 IN | HEART RATE: 70 BPM | SYSTOLIC BLOOD PRESSURE: 154 MMHG | DIASTOLIC BLOOD PRESSURE: 97 MMHG | OXYGEN SATURATION: 98 % | WEIGHT: 195 LBS

## 2020-11-19 PROCEDURE — 99213 OFFICE O/P EST LOW 20 MIN: CPT

## 2020-11-20 NOTE — REVIEW OF SYSTEMS
[Arthralgias] : arthralgias [Joint Pain] : joint pain [Joint Swelling] : joint swelling [Joint Stiffness] : joint stiffness [Fever] : no fever [Chills] : no chills [Feeling Poorly] : not feeling poorly [Feeling Tired] : not feeling tired

## 2020-11-20 NOTE — HISTORY OF PRESENT ILLNESS
[FreeTextEntry1] : This is a 75-year-old man he comes for follow-up visit he has had osteoarthritis of his hips he is also had episodes of gout he reports that he has recently had some increasing pains in his feet and stiffness with some tenderness and redness difficulty in walking he had been on once a day colchicine but has increased that up to twice a day he did see his primary care physician who has done recent blood work apparently reports an elevated creatinine and elevated uric acid he is on a number of other medications he has atrial fibrillation he is on Eliquis he recently has had a number of nosebleeds which were difficult to manage-he avoids nonsteroidal anti-inflammatory medicines-his joints are now substantially better

## 2020-11-20 NOTE — ASSESSMENT
[FreeTextEntry1] : This is a 75-year-old man with a history of gout perhaps a recent episode of gout he transiently increased his colchicine to twice a day with resolution he does have an elevated uric acid we have recommended reducing colchicine back to daily and perhaps even to every other day we have also again explored possible use of allopurinol or even Uloric risks and benefits were discussed with him he will be taking this under consideration and discussed with his wife

## 2020-11-20 NOTE — DATA REVIEWED
[FreeTextEntry1] : Spoke with primary care physician creatinine elevated uric acid 10.7 creatinine 1.6

## 2020-11-20 NOTE — PHYSICAL EXAM
[General Appearance - Alert] : alert [General Appearance - In No Acute Distress] : in no acute distress [General Appearance - Well Nourished] : well nourished [General Appearance - Well Developed] : well developed [Abnormal Walk] : normal gait [Nail Clubbing] : no clubbing  or cyanosis of the fingernails [Musculoskeletal - Swelling] : no joint swelling seen [FreeTextEntry1] : There are no tender or swollen joints in the feet there is no synovitis there is no podagra

## 2020-12-11 ENCOUNTER — NON-APPOINTMENT (OUTPATIENT)
Age: 75
End: 2020-12-11

## 2020-12-11 ENCOUNTER — TRANSCRIPTION ENCOUNTER (OUTPATIENT)
Age: 75
End: 2020-12-11

## 2020-12-14 ENCOUNTER — TRANSCRIPTION ENCOUNTER (OUTPATIENT)
Age: 75
End: 2020-12-14

## 2020-12-17 ENCOUNTER — APPOINTMENT (OUTPATIENT)
Dept: RHEUMATOLOGY | Facility: CLINIC | Age: 75
End: 2020-12-17

## 2020-12-21 ENCOUNTER — TRANSCRIPTION ENCOUNTER (OUTPATIENT)
Age: 75
End: 2020-12-21

## 2020-12-21 ENCOUNTER — NON-APPOINTMENT (OUTPATIENT)
Age: 75
End: 2020-12-21

## 2021-02-09 ENCOUNTER — LABORATORY RESULT (OUTPATIENT)
Age: 76
End: 2021-02-09

## 2021-02-10 ENCOUNTER — APPOINTMENT (OUTPATIENT)
Dept: OTOLARYNGOLOGY | Facility: CLINIC | Age: 76
End: 2021-02-10
Payer: MEDICARE

## 2021-02-10 VITALS — TEMPERATURE: 98.7 F | WEIGHT: 195 LBS | BODY MASS INDEX: 28.88 KG/M2 | HEIGHT: 69 IN

## 2021-02-10 PROCEDURE — 30901 CONTROL OF NOSEBLEED: CPT

## 2021-02-10 PROCEDURE — 99213 OFFICE O/P EST LOW 20 MIN: CPT | Mod: 25

## 2021-02-10 NOTE — REVIEW OF SYSTEMS
[Nose Bleeds] : nose bleeds [Negative] : Heme/Lymph [Patient Intake Form Reviewed] : Patient intake form was reviewed

## 2021-02-11 NOTE — CONSULT LETTER
[Dear  ___] : Dear  [unfilled], [Consult Letter:] : I had the pleasure of evaluating your patient, [unfilled]. [Please see my note below.] : Please see my note below. [Sincerely,] : Sincerely, [FreeTextEntry3] : Babatunde Deng MD\par

## 2021-02-11 NOTE — HISTORY OF PRESENT ILLNESS
[de-identified] : POLA LOCKE is a 75 year man with a history of afib on Eliquis. He had severe episode of left sided epistaxis this morning.

## 2021-05-07 NOTE — ED PROVIDER NOTE - CONSTITUTIONAL, MLM
Statement Selected normal... Well appearing, awake, alert, oriented to person, place, time/situation and in no apparent distress.

## 2021-06-22 NOTE — ED PROVIDER NOTE - CROS ED GI ALL NEG
Called patient to schedule a screening mammogram PATIENTPHONEMESSAGE: left message.-     Additional information     - - -

## 2021-07-25 ENCOUNTER — EMERGENCY (EMERGENCY)
Facility: HOSPITAL | Age: 76
LOS: 1 days | Discharge: HOME | End: 2021-07-25
Attending: EMERGENCY MEDICINE | Admitting: EMERGENCY MEDICINE
Payer: MEDICARE

## 2021-07-25 VITALS
DIASTOLIC BLOOD PRESSURE: 110 MMHG | RESPIRATION RATE: 17 BRPM | SYSTOLIC BLOOD PRESSURE: 170 MMHG | OXYGEN SATURATION: 96 % | HEART RATE: 71 BPM | HEIGHT: 69 IN | TEMPERATURE: 98 F | WEIGHT: 190.04 LBS

## 2021-07-25 DIAGNOSIS — R04.0 EPISTAXIS: ICD-10-CM

## 2021-07-25 DIAGNOSIS — I10 ESSENTIAL (PRIMARY) HYPERTENSION: ICD-10-CM

## 2021-07-25 DIAGNOSIS — Z88.0 ALLERGY STATUS TO PENICILLIN: ICD-10-CM

## 2021-07-25 DIAGNOSIS — Z88.2 ALLERGY STATUS TO SULFONAMIDES: ICD-10-CM

## 2021-07-25 DIAGNOSIS — Z79.01 LONG TERM (CURRENT) USE OF ANTICOAGULANTS: ICD-10-CM

## 2021-07-25 DIAGNOSIS — Z85.07 PERSONAL HISTORY OF MALIGNANT NEOPLASM OF PANCREAS: ICD-10-CM

## 2021-07-25 PROCEDURE — 99283 EMERGENCY DEPT VISIT LOW MDM: CPT

## 2021-07-25 NOTE — ED ADULT TRIAGE NOTE - CHIEF COMPLAINT QUOTE
Pt brought in by EMS for complaint of a nosebleed to the right nare that has been on and off all day. Pt reports he is on Eliquis for afib. Pt hypertensive at triage 170/110 and has not taken bystolic tonight.  Denies headache and dizziness.

## 2021-07-26 RX ORDER — AZILSARTAN KAMEDOXOMIL 40 MG/1
1 TABLET ORAL
Qty: 0 | Refills: 0 | DISCHARGE

## 2021-07-26 RX ORDER — DOXAZOSIN MESYLATE 4 MG
1 TABLET ORAL
Qty: 0 | Refills: 0 | DISCHARGE

## 2021-07-26 RX ORDER — APIXABAN 2.5 MG/1
1 TABLET, FILM COATED ORAL
Qty: 0 | Refills: 0 | DISCHARGE

## 2021-07-26 RX ORDER — FLUOCINOLONE ACETONIDE 0.25 MG/G
0.05 CREAM TOPICAL
Qty: 0 | Refills: 0 | DISCHARGE

## 2021-07-26 RX ORDER — EVOLOCUMAB 140 MG/ML
1 INJECTION, SOLUTION SUBCUTANEOUS
Qty: 0 | Refills: 0 | DISCHARGE

## 2021-07-26 RX ORDER — NEBIVOLOL HYDROCHLORIDE 5 MG/1
1 TABLET ORAL
Qty: 0 | Refills: 0 | DISCHARGE

## 2021-07-26 RX ORDER — EVOLOCUMAB 140 MG/ML
0 INJECTION, SOLUTION SUBCUTANEOUS
Qty: 0 | Refills: 0 | DISCHARGE

## 2021-07-26 RX ORDER — COLCHICINE 0.6 MG
1 TABLET ORAL
Qty: 0 | Refills: 0 | DISCHARGE

## 2021-07-26 RX ORDER — CHOLECALCIFEROL (VITAMIN D3) 125 MCG
25 CAPSULE ORAL
Qty: 0 | Refills: 0 | DISCHARGE

## 2021-07-26 RX ORDER — CHLORTHALIDONE 50 MG
1 TABLET ORAL
Qty: 0 | Refills: 0 | DISCHARGE

## 2021-07-26 NOTE — ED PROVIDER NOTE - PATIENT PORTAL LINK FT
.
You can access the FollowMyHealth Patient Portal offered by [facility] by registering at the following website: [email or web address]. By joining [employer]’s FollowMyHealth portal, you will also be able to view your health information using other applications (apps) compatible with our system.

## 2021-07-26 NOTE — ED PROVIDER NOTE - CLINICAL SUMMARY MEDICAL DECISION MAKING FREE TEXT BOX
Patient presenting with recurrent epistaxis- last time was 1 year ago. No visible source for cautery. Added topical oxymetazoline, TXA and Lido with epi pledget given that it had bled on/ff all day.

## 2021-07-26 NOTE — ED PROVIDER NOTE - NSFOLLOWUPINSTRUCTIONS_ED_ALL_ED_FT
Epistaxis    Epistaxis is the medical term for a nosebleed. Nosebleeds are common and can be caused by many conditions, such as injury, infections, dry environments, medicines, nose picking, and home heating and cooling systems. Try controlling your nosebleed by pinching your nose continuously for at least 10 minutes. Avoid lying down while you are having a nosebleed. Sit up and lean forward. Avoid blowing or sniffing your nose for a number of hours after having a nosebleed. Resume your normal activities as you are able, but avoid straining, lifting, or bending at the waist for several days. Maintain humidity in your home by using less air conditioning or by using a humidifier.     If your nose was packed by your health care provider, keep the packing inside of your nose until a health care provider removes it. If a balloon catheter was used to pack your nose, do not cut or remove it unless your health care provider has instructed you to do that.     Aspirin and blood thinners make bleeding more likely. If you are prescribed these medicines and you suffer from nosebleeds, ask your health care provider if you should stop taking the medicines or adjust the dose. Do not stop medicines unless directed by your health care provider.    SEEK IMMEDIATE MEDICAL CARE IF YOU HAVE ANY OF THE FOLLOWING SYMPTOMS: nosebleed lasting longer than 20 minutes, unusual bleeding from or bruising on other parts of your body, dizziness or lightheadedness, fainting, nosebleed occurring after a head injury, or fever.     If it recurs:    Blow out the clots,  use the oxymetazoline, pack with Vaseline cotton balls and hold lpressure for 30 mins with tongs. Epistaxis    Epistaxis is the medical term for a nosebleed. Nosebleeds are common and can be caused by many conditions, such as injury, infections, dry environments, medicines, nose picking, and home heating and cooling systems. Try controlling your nosebleed by pinching your nose continuously for at least 10 minutes. Avoid lying down while you are having a nosebleed. Sit up and lean forward. Avoid blowing or sniffing your nose for a number of hours after having a nosebleed. Resume your normal activities as you are able, but avoid straining, lifting, or bending at the waist for several days. Maintain humidity in your home by using less air conditioning or by using a humidifier.     If your nose was packed by your health care provider, keep the packing inside of your nose until a health care provider removes it. If a balloon catheter was used to pack your nose, do not cut or remove it unless your health care provider has instructed you to do that.     Aspirin and blood thinners make bleeding more likely. If you are prescribed these medicines and you suffer from nosebleeds, ask your health care provider if you should stop taking the medicines or adjust the dose. Do not stop medicines unless directed by your health care provider.    SEEK IMMEDIATE MEDICAL CARE IF YOU HAVE ANY OF THE FOLLOWING SYMPTOMS: nosebleed lasting longer than 20 minutes, unusual bleeding from or bruising on other parts of your body, dizziness or lightheadedness, fainting, nosebleed occurring after a head injury, or fever.     If it recurs:    Blow out the clots,  use the oxymetazoline, pack with Vaseline cotton balls and hold pressure for 30 mins with tongs.

## 2021-07-26 NOTE — ED PROVIDER NOTE - OBJECTIVE STATEMENT
75 M BIBE for a nosebleed from the right nostril on and off all day.     Pt reports he is on Eliquis for afib. Pt hypertensive at triage 170/110 and has not taken bystolic tonight.    Denies headache and dizziness. 75 M BIBE for a nosebleed from the right nostril on and off all day. He has had them on and off for years. His last one was over a year ago. he has had them cauterized at his ENT before. Today's got quite bad PTA- lasted 2h and he used over 100 cotton balls covered vase lien with pressure to try to stop it. Now that he is here the bleeding as stopped.     Pt reports he is on Eliquis 2.5mg for afib. Pt hypertensive at triage 170/110 and has not taken Bystolic tonight.    Denies headache and dizziness.

## 2021-07-30 ENCOUNTER — APPOINTMENT (OUTPATIENT)
Dept: OTOLARYNGOLOGY | Facility: CLINIC | Age: 76
End: 2021-07-30
Payer: MEDICARE

## 2021-07-30 VITALS — BODY MASS INDEX: 28.14 KG/M2 | WEIGHT: 190 LBS | HEIGHT: 69 IN | TEMPERATURE: 97.5 F

## 2021-07-30 PROCEDURE — 99213 OFFICE O/P EST LOW 20 MIN: CPT

## 2021-07-30 RX ORDER — MECOBAL/LEVOMEFOLAT CA/B6 PHOS 2-3-35 MG
3-35-2 TABLET ORAL
Qty: 30 | Refills: 0 | Status: ACTIVE | COMMUNITY
Start: 2021-06-27

## 2021-07-30 RX ORDER — KETOROLAC TROMETHAMINE 5 MG/ML
0.5 SOLUTION OPHTHALMIC
Qty: 5 | Refills: 0 | Status: COMPLETED | COMMUNITY
Start: 2019-09-25 | End: 2021-07-30

## 2021-07-30 RX ORDER — FEBUXOSTAT 40 MG/1
40 TABLET ORAL
Qty: 30 | Refills: 0 | Status: ACTIVE | COMMUNITY
Start: 2021-06-17

## 2021-07-30 RX ORDER — CLOTRIMAZOLE AND BETAMETHASONE DIPROPIONATE 10; .5 MG/G; MG/G
1-0.05 CREAM TOPICAL
Qty: 45 | Refills: 0 | Status: ACTIVE | COMMUNITY
Start: 2021-06-07

## 2021-07-30 RX ORDER — CLOBETASOL PROPIONATE 0.5 MG/G
0.05 CREAM TOPICAL
Qty: 60 | Refills: 0 | Status: ACTIVE | COMMUNITY
Start: 2021-06-28

## 2021-07-30 RX ORDER — BACILLUS COAGULANS/INULIN 1B-250 MG
CAPSULE ORAL
Refills: 0 | Status: ACTIVE | COMMUNITY

## 2021-07-30 RX ORDER — CHROMIUM 200 MCG
TABLET ORAL
Refills: 0 | Status: ACTIVE | COMMUNITY

## 2021-07-30 NOTE — HISTORY OF PRESENT ILLNESS
[de-identified] : POLA LOCKE is a 75 year man with a history of afib on Eliquis. He had nose bleed 72 hours ago from right side. He went to ER and was treated.

## 2021-07-31 ENCOUNTER — EMERGENCY (EMERGENCY)
Facility: HOSPITAL | Age: 76
LOS: 1 days | Discharge: HOME | End: 2021-07-31
Attending: EMERGENCY MEDICINE | Admitting: EMERGENCY MEDICINE
Payer: MEDICARE

## 2021-07-31 VITALS
RESPIRATION RATE: 16 BRPM | DIASTOLIC BLOOD PRESSURE: 97 MMHG | OXYGEN SATURATION: 99 % | TEMPERATURE: 98 F | SYSTOLIC BLOOD PRESSURE: 138 MMHG | HEART RATE: 69 BPM

## 2021-07-31 VITALS
HEART RATE: 65 BPM | WEIGHT: 190.04 LBS | HEIGHT: 69 IN | SYSTOLIC BLOOD PRESSURE: 187 MMHG | TEMPERATURE: 98 F | OXYGEN SATURATION: 98 % | DIASTOLIC BLOOD PRESSURE: 102 MMHG | RESPIRATION RATE: 18 BRPM

## 2021-07-31 DIAGNOSIS — I48.91 UNSPECIFIED ATRIAL FIBRILLATION: ICD-10-CM

## 2021-07-31 DIAGNOSIS — Z88.2 ALLERGY STATUS TO SULFONAMIDES: ICD-10-CM

## 2021-07-31 DIAGNOSIS — R04.0 EPISTAXIS: ICD-10-CM

## 2021-07-31 DIAGNOSIS — Z88.0 ALLERGY STATUS TO PENICILLIN: ICD-10-CM

## 2021-07-31 DIAGNOSIS — Z79.01 LONG TERM (CURRENT) USE OF ANTICOAGULANTS: ICD-10-CM

## 2021-07-31 PROCEDURE — 30901 CONTROL OF NOSEBLEED: CPT

## 2021-07-31 PROCEDURE — 99291 CRITICAL CARE FIRST HOUR: CPT | Mod: 25

## 2021-07-31 RX ORDER — OXYMETAZOLINE HYDROCHLORIDE 0.5 MG/ML
2 SPRAY NASAL ONCE
Refills: 0 | Status: COMPLETED | OUTPATIENT
Start: 2021-07-31 | End: 2021-07-31

## 2021-07-31 RX ORDER — TRANEXAMIC ACID 100 MG/ML
5 INJECTION, SOLUTION INTRAVENOUS ONCE
Refills: 0 | Status: COMPLETED | OUTPATIENT
Start: 2021-07-31 | End: 2021-07-31

## 2021-07-31 RX ADMIN — TRANEXAMIC ACID 5 MILLILITER(S): 100 INJECTION, SOLUTION INTRAVENOUS at 11:38

## 2021-07-31 RX ADMIN — OXYMETAZOLINE HYDROCHLORIDE 2 SPRAY(S): 0.5 SPRAY NASAL at 11:38

## 2021-07-31 NOTE — ED PROVIDER NOTE - NSFOLLOWUPINSTRUCTIONS_ED_ALL_ED_FT
follow up with you ENT doctor this week    keep nares moist     avoid agents that might irritate nasal passage - ie smoke     return to ER if nose bleed starts and direct pressure does not virginia bleeding - or for any other concern

## 2021-07-31 NOTE — ED PROVIDER NOTE - PROGRESS NOTE DETAILS
txa guaze in nostril - no complaints of cough sob sensation fo bleeding in throat  no active bleeding from right nostril - end of guaze no blood. txa packing removed post 25 min - no bleeding - will observe - if no bleeding dc home f/u ent.   wife present in room

## 2021-07-31 NOTE — ED PROVIDER NOTE - CRITICAL CARE ATTENDING CONTRIBUTION TO CARE
The patient was seen immediately upon arrival due to a high probability of imminent or life-threatening deterioration secondary to epistaxis , which required my direct attention, intervention, and personal management at the bedside. I have personally provided critical care time exclusive of time spent on separately billable procedures. Time includes review of laboratory data, radiology results, discussion with consultants, discussion with the patient's family, and monitoring for potential decompensation.

## 2021-07-31 NOTE — ED PROVIDER NOTE - OBJECTIVE STATEMENT
74 yo male on eliqious for afib - presents with his wife with the complaint of bleeding from left nostril since 9am this morning.   pt was traeted at [facility] 7/26 for same in right nostril - bleeding controlled with txa. pt evaulated by his ENT yesterday. - no interventions made.      at present The patient denies the following symptoms:  headache, dizziness/lightheadedness, neck pain/neck stiffness, numbness/tingling, photophobia, change in vision/hearing/gait/mental status/speech,difficulty swallowing, focal weakness, fever, chills, chest/neck/jaw/arm or back pain, palpitations, shortness of breath, diaphoresis, swelling to arm and/or legs, N/V/D, abdominal pain, abdominal distention, back pain, flank pain 74 yo male on Eliquis for afib - presents with his wife with the complaint of bleeding from left nostril since 9am this morning.   pt was treated at [facility] 7/26 for same in right nostril - bleeding controlled with txa. pt evaluated by his ENT yesterday. - no interventions made.      at present The patient denies the following symptoms:  headache, dizziness/lightheadedness, neck pain/neck stiffness, numbness/tingling, photophobia, change in vision/hearing/gait/mental status/speech,difficulty swallowing, focal weakness, fever, chills, chest/neck/jaw/arm or back pain, palpitations, shortness of breath, diaphoresis, swelling to arm and/or legs, N/V/D, abdominal pain, abdominal distention, back pain, flank pain

## 2021-07-31 NOTE — ED ADULT TRIAGE NOTE - CHIEF COMPLAINT QUOTE
was seen earlier this week for same complaint- reports bleeding from both nares - on blood thinner- denies dizziness, sob

## 2021-07-31 NOTE — ED PROVIDER NOTE - ENMT, MLM
Airway patent, Nasal mucosa clear. Mouth with normal mucosa. Throat has no vesicles, no oropharyngeal exudates and uvula is midline.  no clots in oropharynx   + active mild bleeding left nares anterior Airway patent, Nasal mucosa clear. Mouth with normal mucosa. Throat has no vesicles, no oropharyngeal exudates and uvula is midline.  no clots in oropharynx moderate active bleeding from left nares   + active mild bleeding left nares anterior

## 2021-07-31 NOTE — ED PROVIDER NOTE - PATIENT PORTAL LINK FT
You can access the FollowMyHealth Patient Portal offered by [facility] by registering at the following website: [email or web address]. By joining [employer]’s FollowMyHealth portal, you will also be able to view your health information using other applications (apps) compatible with our system.

## 2021-08-17 ENCOUNTER — APPOINTMENT (OUTPATIENT)
Dept: OTOLARYNGOLOGY | Facility: CLINIC | Age: 76
End: 2021-08-17
Payer: MEDICARE

## 2021-08-17 DIAGNOSIS — R04.0 EPISTAXIS: ICD-10-CM

## 2021-08-17 PROCEDURE — 31231 NASAL ENDOSCOPY DX: CPT

## 2021-08-17 RX ORDER — APIXABAN 2.5 MG/1
2.5 TABLET, FILM COATED ORAL
Qty: 60 | Refills: 0 | Status: DISCONTINUED | COMMUNITY
Start: 2021-02-18 | End: 2021-08-17

## 2021-08-17 RX ORDER — ALLOPURINOL 100 MG/1
100 TABLET ORAL
Qty: 30 | Refills: 0 | Status: DISCONTINUED | COMMUNITY
Start: 2021-03-25 | End: 2021-08-17

## 2021-08-24 NOTE — REASON FOR VISIT
[Epistaxis] : epistaxis [Subsequent Evaluation] : a subsequent evaluation for [FreeTextEntry2] : epistaxis

## 2021-08-24 NOTE — HISTORY OF PRESENT ILLNESS
[de-identified] : POLA LOCKE is a 75 year man with a history of afib and chronic HBP on Eliquis with epistaxis. He hasn’t bled in 2 weeks.

## 2021-09-11 ENCOUNTER — EMERGENCY (EMERGENCY)
Facility: HOSPITAL | Age: 76
LOS: 1 days | Discharge: HOME | End: 2021-09-11
Attending: EMERGENCY MEDICINE | Admitting: EMERGENCY MEDICINE
Payer: MEDICARE

## 2021-09-11 VITALS
OXYGEN SATURATION: 96 % | HEIGHT: 69 IN | SYSTOLIC BLOOD PRESSURE: 150 MMHG | DIASTOLIC BLOOD PRESSURE: 107 MMHG | WEIGHT: 190.04 LBS | RESPIRATION RATE: 16 BRPM | TEMPERATURE: 98 F | HEART RATE: 76 BPM

## 2021-09-11 DIAGNOSIS — Z85.07 PERSONAL HISTORY OF MALIGNANT NEOPLASM OF PANCREAS: ICD-10-CM

## 2021-09-11 DIAGNOSIS — Z79.01 LONG TERM (CURRENT) USE OF ANTICOAGULANTS: ICD-10-CM

## 2021-09-11 DIAGNOSIS — R04.0 EPISTAXIS: ICD-10-CM

## 2021-09-11 DIAGNOSIS — I48.91 UNSPECIFIED ATRIAL FIBRILLATION: ICD-10-CM

## 2021-09-11 DIAGNOSIS — Z88.0 ALLERGY STATUS TO PENICILLIN: ICD-10-CM

## 2021-09-11 DIAGNOSIS — I10 ESSENTIAL (PRIMARY) HYPERTENSION: ICD-10-CM

## 2021-09-11 DIAGNOSIS — Z88.2 ALLERGY STATUS TO SULFONAMIDES: ICD-10-CM

## 2021-09-11 PROCEDURE — 30903 CONTROL OF NOSEBLEED: CPT

## 2021-09-11 PROCEDURE — 99283 EMERGENCY DEPT VISIT LOW MDM: CPT | Mod: 25

## 2021-09-11 RX ORDER — TRANEXAMIC ACID 100 MG/ML
5 INJECTION, SOLUTION INTRAVENOUS ONCE
Refills: 0 | Status: COMPLETED | OUTPATIENT
Start: 2021-09-11 | End: 2021-09-11

## 2021-09-11 RX ADMIN — TRANEXAMIC ACID 5 MILLILITER(S): 100 INJECTION, SOLUTION INTRAVENOUS at 15:03

## 2021-09-11 NOTE — ED PROVIDER NOTE - PATIENT PORTAL LINK FT
You can access the FollowMyHealth Patient Portal offered by [facility] by registering at the following website: [email or web address]. By joining [employer]’s FollowMyHealth portal, you will also be able to view your health information using other applications (apps) compatible with our system.

## 2021-09-11 NOTE — ED PROVIDER NOTE - NS ED ROS FT
· CONSTITUTIONAL: no fever and no chills.  · ENMT: +EPISTAXIS  · CARDIOVASCULAR: normal rate and rhythm, no chest pain and no edema.  · RESPIRATORY: no chest pain, no cough, and no shortness of breath.  · MUSCULOSKELETAL: no back pain, no musculoskeletal pain, no neck pain, and no weakness.  · SKIN: no abrasions, no jaundice, no lesions, no pruritis, and no rashes.  · NEURO: no loss of consciousness, no gait abnormality, no headache, no sensory deficits, and no weakness.  · PSYCHIATRIC: no known mental health issues.

## 2021-09-11 NOTE — ED PROVIDER NOTE - OBJECTIVE STATEMENT
76 yo male w/ history of atrial fibrillation, HTN, pancreatic tumor presents to the ED with epistaxis. 74 yo male w/ history of atrial fibrillation, HTN, pancreatic tumor presents to the ED with epistaxis to L nare today. Pt denies trauma or injury to nose. Pt is currently on Eliquis for atrial fibrillation. No fever, chills, N/V, pain. 74 yo male w/ history of atrial fibrillation, HTN, pancreatic tumor presents to the ED with epistaxis to Left nare today. Pt denies trauma or injury to nose. Pt is currently on Eliquis for atrial fibrillation. No fever, chills, N/V, pain.    previously seen by me approx. 1 month ago for epistaxis resolved with txa 74 yo male w/ history of atrial fibrillation, HTN, pancreatic tumor presents to the ED with epistaxis to Left nares today. Pt denies trauma or injury to nose. Pt is currently on Eliquis for atrial fibrillation. No fever, chills, N/V, pain.    previously seen by me approx. 1 month ago for epistaxis resolved with txa

## 2021-09-11 NOTE — ED PROVIDER NOTE - CARE PROVIDER_API CALL
Tony Monroe)  Otolaryngology  [address] [place] [address], [address]  [place], [place] [address]  Phone: [phone]  Fax: [phone]  Follow Up Time: 1-3 Days

## 2021-09-11 NOTE — ED PROVIDER NOTE - CARE PROVIDERS DIRECT ADDRESSES
[email or web address]

## 2021-09-11 NOTE — ED PROVIDER NOTE - PHYSICAL EXAMINATION
VITAL SIGNS: I have reviewed nursing notes and confirm.  CONSTITUTIONAL: Well-developed; well-nourished; in no acute distress.  SKIN: Skin is warm and dry, no acute rash.  HEAD: Normocephalic; atraumatic.  EYES: PERRL, EOM intact; conjunctiva and sclera clear.  ENT: +minimal but persistent L anterior epistaxis.  NECK: Supple; non tender.  CARD: S1, S2 normal; no murmurs, gallops, or rubs. Regular rate and rhythm.  RESP: No wheezes, rales or rhonchi.  ABD: Normal bowel sounds; soft; non-distended; non-tender; no hepatosplenomegaly.  MSK: Normal ROM. No clubbing, cyanosis or edema.  NEURO: Alert, oriented. Grossly unremarkable.  PSYCH: Cooperative, appropriate. VITAL SIGNS: I have reviewed nursing notes and confirm.  CONSTITUTIONAL: Well-developed; well-nourished; in no acute distress.  SKIN: Skin is warm and dry, no acute rash.  HEAD: Normocephalic; atraumatic.  EYES: PERRL, EOM intact; conjunctiva and sclera clear.  ENT: +minimal but persistent Left anterior epistaxis. no blood in oropharynx   NECK: Supple; non tender.  CARD: S1, S2 normal; no murmurs, gallops, or rubs. Regular rate and rhythm.  RESP: No wheezes, rales or rhonchi.  ABD: Normal bowel sounds; soft; non-distended; non-tender; no hepatosplenomegaly.  MSK: Normal ROM. No clubbing, cyanosis or edema.  NEURO: Alert, oriented. Grossly unremarkable.  PSYCH: Cooperative, appropriate.

## 2021-09-11 NOTE — ED ADULT NURSE NOTE - CHIEF COMPLAINT QUOTE
Nose bleed x 2 hrs, prior to arrival pt packed left nare. bleeding is controlled at this time , on Eliquis

## 2021-09-11 NOTE — ED PROVIDER NOTE - NSFOLLOWUPINSTRUCTIONS_ED_ALL_ED_FT
ED evaluation and management discussed with the patient and family (if available) in detail.  Close PMD follow up encouraged.  Strict ED return instructions discussed in detail and patient given the opportunity to ask any questions about their discharge diagnosis and instructions. Patient verbalized understanding.    left nares packing removed - no bleeding x 15 minutes     dc home with wife present throughout ed stay

## 2021-09-11 NOTE — ED ADULT TRIAGE NOTE - CHIEF COMPLAINT QUOTE
Nose bleed x 2 hrs, prior to arrival pt packed left nare Nose bleed x 2 hrs, prior to arrival pt packed left nare. bleeding is controlled at this time , on Eliquis

## 2021-09-11 NOTE — ED PROVIDER NOTE - CLINICAL SUMMARY MEDICAL DECISION MAKING FREE TEXT BOX
ED evaluation and management discussed with the patient and family (if available) in detail.  Close PMD follow up encouraged.  Strict ED return instructions discussed in detail and patient given the opportunity to ask any questions about their discharge diagnosis and instructions. Patient verbalized understanding.      packing removed from left nares - no bleeding x 15 minutes   dc home with wife

## 2021-09-11 NOTE — ED PROCEDURE NOTE - NS_EDPROVIDERDISPOUSERTYPE_ED_A_ED
Scribe Attestation (For Scribes USE Only)... I have personally evaluated and examined the patient. The Attending was available to me as a supervising provider if needed./Attending Attestation (For Attendings USE Only).../Scribe Attestation (For Scribes USE Only)...

## 2021-10-14 ENCOUNTER — APPOINTMENT (OUTPATIENT)
Dept: OTOLARYNGOLOGY | Facility: CLINIC | Age: 76
End: 2021-10-14

## 2021-12-08 ENCOUNTER — APPOINTMENT (OUTPATIENT)
Dept: ORTHOPEDIC SURGERY | Facility: CLINIC | Age: 76
End: 2021-12-08
Payer: MEDICARE

## 2021-12-08 DIAGNOSIS — M48.061 SPINAL STENOSIS, LUMBAR REGION WITHOUT NEUROGENIC CLAUDICATION: ICD-10-CM

## 2021-12-08 DIAGNOSIS — Z86.39 PERSONAL HISTORY OF OTHER ENDOCRINE, NUTRITIONAL AND METABOLIC DISEASE: ICD-10-CM

## 2021-12-08 PROCEDURE — 99214 OFFICE O/P EST MOD 30 MIN: CPT

## 2021-12-09 PROBLEM — Z86.39 HISTORY OF HIGH CHOLESTEROL: Status: RESOLVED | Noted: 2021-12-08 | Resolved: 2021-12-09

## 2021-12-13 PROBLEM — M48.061 LUMBAR STENOSIS: Status: ACTIVE | Noted: 2021-12-13

## 2021-12-21 NOTE — PHYSICAL EXAM
[de-identified] : General: patient is well developed, well nourished, in no acute \par distress, alert and oriented x 3. \par \par Mood and affect: normal\par \par Respiratory: no respiratory distress noted\par \par Skin: no scars over spine, skin intact, no erythema, increased warmth\par \par Alignment:The spine is well compensated in the coronal and sagittal plane. \par \par Gait: The patient is able to toe walk and heel walk without difficulty.\par \par Palpation: no tenderness to palpation spine or paraspinal region\par \par Range of motion: Lumbar spine ROM is restricted\par \par Neurologic Exam:\par Motor: Manual Muscle testing in the lower extremities is 5 out of 5 in all muscle groups. There is no evidence of muscular atrophy in the lower extremities. Sensory: Sensation to light touch is grossly intact in the lower extremities\par \par Reflexes: DTR are present and symmetric throughout\par \par Hip Exam: No pain with internal or external rotation of hips bilaterally\par \par Special tests: Straight leg raise test negative.  Cross straight leg test negative. \par \par Vascular: Examination of the peripheral vascular system demonstrates no evidence of congestion or edema. no evidence of lymphedema bilateral lower extremities, pulses are present and symmetric in both lower extremities.\par \par  [de-identified] : XR 10/2021: multilevel degenerative changes, grade 1 anterolisthesis L4 on L5 without dynamic instability, no fractures\par \par MRI lumbar 9/2021:  L4/L5 grade 1 anterolisthesis, disc bulge and posterior element hypertrophy with severe central canal stenosis

## 2021-12-21 NOTE — DISCUSSION/SUMMARY
[de-identified] : Discussed my findings with the patient. Mr. Clark has been suffering from low back pain that radiates down both lower extremities, progressing over the past 3-4 years. Imaging shows  L4/L5 grade 1 anterolisthesis, disc bulge and posterior element hypertrophy with severe central canal stenosis. Discussed with patient that these findings correspond to the symptoms he has been experiencing. Patient has failed extensive conservative management, including oral medication, physical therapy, and steroid injections. The patient is a candidate for L4/L5 laminectomy, posterior spinal fusion with instrumentation. Further non operative treatment would include no surgery. Discussed posterior spinal fusion with instrumentation necessary given anterolisthesis at that level. Explained the risk for early failure and need for further surgery if fusion not performed. Risks, benefits, alternatives were discussed with the patient at great length, including but not limited to, bleeding, infection, persistent neurologic deficit, worsening pain, worsening neurologic status, pseudoarthrosis, hardware migration/failure, cerebrospinal fluid leak, medical complications (including but not limited to cardiac, pulmonary, renal), and the need for further surgery.  The patient asked a number of cogent questions.  All questions were answered.  The patient demonstrated understanding of the risks, benefits, and alternatives.  The patient has elected to consider surgery. He will reach out to office if he wishes to proceed.\par \par \par

## 2021-12-21 NOTE — HISTORY OF PRESENT ILLNESS
[de-identified] : Mr. LOCKE is a very pleasant 76 year old male who complains of chronic low back pain, gradually progressing over the past 3-4 years, atraumatic. Pain radiates down both lower extremities to his feet. He has failed extensive conservative management, including oral medication, physical therapy, and steroid injections.\par \par The patient no history of previous spine surgery.\par \par The patient has no history of unexpected weight loss, no history of active cancer, no history bladder or bowel dysfunction, no night pain, no fevers or chills.\par \par The past medical history, surgical history, family history, allergies, medications, 10+ point review of systems, family history and social history were reviewed and non contributory.\par \par

## 2022-01-28 ENCOUNTER — EMERGENCY (EMERGENCY)
Facility: HOSPITAL | Age: 77
LOS: 1 days | Discharge: HOME | End: 2022-01-28
Attending: EMERGENCY MEDICINE | Admitting: EMERGENCY MEDICINE
Payer: MEDICARE

## 2022-01-28 VITALS
HEIGHT: 69 IN | WEIGHT: 190.04 LBS | SYSTOLIC BLOOD PRESSURE: 188 MMHG | HEART RATE: 66 BPM | TEMPERATURE: 98 F | RESPIRATION RATE: 18 BRPM | OXYGEN SATURATION: 99 % | DIASTOLIC BLOOD PRESSURE: 90 MMHG

## 2022-01-28 DIAGNOSIS — R04.0 EPISTAXIS: ICD-10-CM

## 2022-01-28 DIAGNOSIS — Z88.2 ALLERGY STATUS TO SULFONAMIDES: ICD-10-CM

## 2022-01-28 DIAGNOSIS — Z88.0 ALLERGY STATUS TO PENICILLIN: ICD-10-CM

## 2022-01-28 PROCEDURE — 99283 EMERGENCY DEPT VISIT LOW MDM: CPT

## 2022-01-28 RX ORDER — OXYMETAZOLINE HYDROCHLORIDE 0.5 MG/ML
2 SPRAY NASAL ONCE
Refills: 0 | Status: COMPLETED | OUTPATIENT
Start: 2022-01-28 | End: 2022-01-28

## 2022-01-28 RX ADMIN — OXYMETAZOLINE HYDROCHLORIDE 2 SPRAY(S): 0.5 SPRAY NASAL at 21:39

## 2022-01-28 NOTE — ED ADULT TRIAGE NOTE - CHIEF COMPLAINT QUOTE
here for epistaxis since yesterday, PMD cauterized with no success- pt reports bleeding began again today- takes Eliquis for afib-

## 2022-01-29 NOTE — ED PROVIDER NOTE - ATTENDING CONTRIBUTION TO CARE
75 yo male wioth hx HTN AFIB on eliquis with recurrent epistaxis, tried Surgicel at ENT office, nasal packing at home with no relief of sx. Treated in ED with TXA topical, observed, 2nd dose given, bleeding stopped, no posterior bleeding noted. Pt tolerated PO, I had an extensive conversation with him re environmental factors and home care for epistaxis,  stable for dc home.

## 2022-01-29 NOTE — ED PROVIDER NOTE - CLINICAL SUMMARY MEDICAL DECISION MAKING FREE TEXT BOX
on eliquis, atraumatic right nare epistaxis resolved after administration of afrin, TXA atomizer, observed in ED with no further episodes, tolerating po, no posterior bleed, advised f/u pmd ENT. return precautions discussed.

## 2022-01-29 NOTE — ED PROVIDER NOTE - OBJECTIVE STATEMENT
77 yo male with pmhx ablation for afib 2 weeks ago, on eliquis, c/o small amount of bleeding from right nare that started today. had nosebleed yesterday and saw ENT yesterday, had cautery and surgicell placed to floor of right nare. history of similar in the past. no trauma.

## 2022-01-29 NOTE — ED PROVIDER NOTE - PHYSICAL EXAMINATION
Skin normal color for race, warm, dry and intact. No evidence of rash.
CONSTITUTIONAL: Well-appearing; well-nourished; in no apparent distress.   	HEAD: Normocephalic; atraumatic.   	EYES:  conjunctiva and sclera clear  	ENT: right nare slow bleed. small amount of blood posterior pharynx.   	NECK: Supple; non-tender;   	EXT: No cyanosis or edema; N/V intact  	SKIN: Normal for age and race; warm; dry; good turgor; no apparent lesions or rash.   	NEURO: A & O x 3; face symmetric; grossly unremarkable.   PSYCHOLOGICAL: The patient’s mood and manner are appropriate.

## 2022-01-29 NOTE — ED PROVIDER NOTE - PATIENT PORTAL LINK FT
You can access the FollowMyHealth Patient Portal offered by [facility] by registering at the following website: [email or web address]. By joining [facility]’s FollowMyHealth portal, you will also be able to view your health information using other applications (apps) compatible with our system.

## 2022-01-29 NOTE — ED PROVIDER NOTE - NSFOLLOWUPINSTRUCTIONS_ED_ALL_ED_FT
Follow up with your ENT doctor.     Epistaxis is the medical term for a nosebleed. Nosebleeds are common and can be caused by many conditions, such as injury, infections, dry environments, medicines, nose picking, and home heating and cooling systems. Try controlling your nosebleed by pinching your nose continuously for at least 10 minutes. Avoid lying down while you are having a nosebleed. Sit up and lean forward. Avoid blowing or sniffing your nose for a number of hours after having a nosebleed. Resume your normal activities as you are able, but avoid straining, lifting, or bending at the waist for several days. Maintain humidity in your home by using less air conditioning or by using a humidifier.     If your nose was packed by your health care provider, keep the packing inside of your nose until a health care provider removes it. If a balloon catheter was used to pack your nose, do not cut or remove it unless your health care provider has instructed you to do that.     Aspirin and blood thinners make bleeding more likely. If you are prescribed these medicines and you suffer from nosebleeds, ask your health care provider if you should stop taking the medicines or adjust the dose. Do not stop medicines unless directed by your health care provider.    SEEK IMMEDIATE MEDICAL CARE IF YOU HAVE ANY OF THE FOLLOWING SYMPTOMS: nosebleed lasting longer than 20 minutes, unusual bleeding from or bruising on other parts of your body, dizziness or lightheadedness, fainting, nosebleed occurring after a head injury, or fever.

## 2022-02-06 NOTE — ED PROVIDER NOTE - CLINICAL SUMMARY MEDICAL DECISION MAKING FREE TEXT BOX
no chest pain, no cough, and no shortness of breath.
progressive improvement throughout ed stay - nosebleed cessation s/p afrin and txa.  observed for 45 min post packing removal   no bleeding.   no complaint of dizziness lightheadedness cp sob sweating  wife present throughout ed stay     The patient tolerated PO fluids.  ED evaluation and management discussed with the patient and wife in detail.  Close PMD and/or specialist follow up encouraged.  Strict ED return instructions discussed in detail for any worsening or new symptoms. The patient was given the opportunity to ask any questions about their discharge diagnosis and discharge instructions. The patient verbalized understanding of these instructions and need to return to the ED for any worsening of illness or for any concern. The patient received a printed version of the discharge instructions. The patient understands the Emergency Department diagnosis is a preliminary diagnosis often based on limited information and that the patient must adhere to the follow-up plan as discussed.  At the time of discharge from the Emergency Department, the patient is alert with fluent appropriate speech and ambulatory without difficulty.  A medical screening examination was performed and no emergency medical condition was identified.

## 2022-03-30 NOTE — ED ADULT NURSE NOTE - HIV OFFER
Detail Level: Detailed
Size Of Lesion: 1.5 cm
Morphology Per Location (Optional): c/w lipoma vs cyst, favor lipoma
Previously Declined (within the last year)

## 2022-05-10 ENCOUNTER — APPOINTMENT (OUTPATIENT)
Dept: OTOLARYNGOLOGY | Facility: CLINIC | Age: 77
End: 2022-05-10
Payer: MEDICARE

## 2022-05-10 VITALS — BODY MASS INDEX: 28.14 KG/M2 | HEIGHT: 69 IN | WEIGHT: 190 LBS | TEMPERATURE: 97.3 F

## 2022-05-10 DIAGNOSIS — H69.80 OTHER SPECIFIED DISORDERS OF EUSTACHIAN TUBE, UNSPECIFIED EAR: ICD-10-CM

## 2022-05-10 DIAGNOSIS — J00 ACUTE NASOPHARYNGITIS [COMMON COLD]: ICD-10-CM

## 2022-05-10 DIAGNOSIS — H93.293 OTHER ABNORMAL AUDITORY PERCEPTIONS, BILATERAL: ICD-10-CM

## 2022-05-10 PROCEDURE — 92567 TYMPANOMETRY: CPT

## 2022-05-10 PROCEDURE — 31231 NASAL ENDOSCOPY DX: CPT

## 2022-05-10 PROCEDURE — 92557 COMPREHENSIVE HEARING TEST: CPT

## 2022-05-10 PROCEDURE — 99214 OFFICE O/P EST MOD 30 MIN: CPT | Mod: 25

## 2022-05-10 RX ORDER — DOXYCYCLINE HYCLATE 100 MG/1
100 CAPSULE ORAL
Qty: 20 | Refills: 0 | Status: ACTIVE | COMMUNITY
Start: 2022-05-10 | End: 1900-01-01

## 2022-05-10 NOTE — ASSESSMENT
[FreeTextEntry1] : POLA LOCKE has left sided early DAYANNA. I will treat with Doxycycline. He will all me in 5 days.
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
## 2022-05-10 NOTE — REASON FOR VISIT
[Subsequent Evaluation] : a subsequent evaluation for [FreeTextEntry2] : sinus issue + hearing problem

## 2022-05-11 PROBLEM — H93.293 ABNORMAL AUDITORY PERCEPTION OF BOTH EARS: Status: ACTIVE | Noted: 2022-05-11

## 2022-07-05 ENCOUNTER — APPOINTMENT (OUTPATIENT)
Dept: OTOLARYNGOLOGY | Facility: CLINIC | Age: 77
End: 2022-07-05

## 2022-07-12 NOTE — ED ADULT NURSE NOTE - CHIEF COMPLAINT QUOTE
Social Work:    Received a call back from Rob Trevin Alex who advised that discharge plan for home is fine with him if desired by [place]. [facility] if following.     Electronically signed by JIGAR Vasques on 7/12/2022 at 1:59 PM was seen earlier this week for same complaint- reports bleeding from both nares - on blood thinner- denies dizziness, sob

## 2022-07-20 ENCOUNTER — APPOINTMENT (OUTPATIENT)
Dept: OTOLARYNGOLOGY | Facility: CLINIC | Age: 77
End: 2022-07-20

## 2024-08-19 NOTE — ED PROVIDER NOTE - CPE EDP RESP NORM
Continue same medications and treatments.   Patient educated on proper medication use.   Patient educated on risk factor modification.   Please bring any lab results from other providers / physicians to your next appointment.     Please bring all medicines, vitamins, and herbal supplements with you when you come to the office.     Prescriptions will not be filled unless you are compliant with your follow up appointments or have a follow up appointment scheduled as per instruction of your physician. Refills should be requested at the time of your visit.    CT CALCIUM SCORE ORDERED  ECHO ORDERED    STOP SIMVASTATIN    FOLLOW UP IN 1 YEAR    I, Hortencia Giang LPN, am scribing for and in the presence of Dr. Roni Newsome, DO, FACC         normal...

## 2025-01-13 NOTE — ED PROVIDER NOTE - NSCAREINITIATED _GEN_ER
Patient is schedule for appt to establish care of 4/2/25. LOV was on 9/21/23 with JOAO White. Patient is requesting refill of sertraline 50 mg 1 tablet daily. Please advise.   Ángel Dorsey(Attending)

## 2025-03-07 NOTE — ED ADULT NURSE NOTE - CAS EDN DISCHARGE ASSESSMENT
She could also try adding pepcid as well to see if it helps the itching. Pepcid is a stomach acid medicine but it works on histamine so it can also help with itching   Alert and oriented to person, place and time

## 2025-04-03 ENCOUNTER — NON-APPOINTMENT (OUTPATIENT)
Age: 80
End: 2025-04-03